# Patient Record
Sex: MALE | Race: WHITE | Employment: FULL TIME | URBAN - METROPOLITAN AREA
[De-identification: names, ages, dates, MRNs, and addresses within clinical notes are randomized per-mention and may not be internally consistent; named-entity substitution may affect disease eponyms.]

---

## 2017-01-16 DIAGNOSIS — I10 HYPERTENSION GOAL BP (BLOOD PRESSURE) < 140/90: Primary | ICD-10-CM

## 2017-01-16 NOTE — TELEPHONE ENCOUNTER
Lisinopril 10Mg Tablets      Last Written Prescription Date: 12/9/16  Last Fill Quantity: 30, # refills: 0  Last Office Visit with G, P or [facility] prescribing provider: 12/18/15       POTASSIUM   Date Value Ref Range Status   12/18/2015 4.6 3.4 - 5.3 mmol/L Final     CREATININE   Date Value Ref Range Status   12/18/2015 1.11 0.66 - 1.25 mg/dL Final     BP Readings from Last 3 Encounters:   12/18/15 154/100   10/09/15 130/86   08/01/14 136/88

## 2017-01-17 RX ORDER — LISINOPRIL 10 MG/1
10 TABLET ORAL DAILY
Qty: 30 TABLET | Refills: 0 | Status: SHIPPED | OUTPATIENT
Start: 2017-01-17 | End: 2017-01-24

## 2017-01-17 NOTE — TELEPHONE ENCOUNTER
Telephone call to patient regarding refill request. Informed patient that his last clinic visit was 12/18/2015 and he will need to schedule a clinic appointment prior to refilling medication.     Patient declines to schedule at this time. Will call back to do so. Will wait for patient to schedule an appointment and then continue to process refill request.       Dawna Jacques RN  [facility]

## 2017-01-17 NOTE — TELEPHONE ENCOUNTER
Patient has scheduled a follow up visit with Dr. Acevedo for high blood pressure on 1/24/17.    Patient requests a refill of the following medication:   lisinopril (PRINIVIL/ZESTRIL) 10 MG tablet      Dr. Acevedo, patient would like a refill of his medication until his 1/24/17 clinic visit with you.       Last clinic visit: 12/18/15  One 30 day christopher refill was provided to patient last month.     Pharmacy and medication cued.    Dawna Jacques RN  [facility]

## 2017-01-20 DIAGNOSIS — G89.29 CHRONIC BILATERAL LOW BACK PAIN WITHOUT SCIATICA: Primary | ICD-10-CM

## 2017-01-20 DIAGNOSIS — M54.50 CHRONIC BILATERAL LOW BACK PAIN WITHOUT SCIATICA: Primary | ICD-10-CM

## 2017-01-20 NOTE — TELEPHONE ENCOUNTER
Please call patient  I have not seen him in over a year , so is way overdue for OV/labs  Ok 1 month but needs to see me within a month for OV/ fasting labs

## 2017-01-20 NOTE — TELEPHONE ENCOUNTER
Routing refill request to provider for review/approval because:  Drug diagnosis of chronic low back pain is not on [facility] protocol    Dr. Acevedo -- please review and sign/advise. Thank you    OCTAVIO KaminskiN, RN  [facility]

## 2017-01-20 NOTE — TELEPHONE ENCOUNTER
Left message on patient's cell phone to call and schedule.    Mariela Araujo, OCTAVION, RN  [facility] [place] [facility]

## 2017-01-20 NOTE — TELEPHONE ENCOUNTER
Amitriptyline 25MG Tablets      Last Written Prescription Date: 10/21/16  Last Quantity: 90, # refills: 0  Last Office Visit with [facility], [facility] or [facility] prescribing provider: 12/18/15   Next 5 appointments (look out 90 days)     Jan 24, 2017  3:30 PM   Office Visit with Christopher [facility]o [place]   [facility]    [address] [place] [id number] [phone]                   CREATININE   Date Value Ref Range Status   12/18/2015 1.11 0.66 - 1.25 mg/dL Final     AST       19   10/9/2015  ALT       42   10/9/2015  BP Readings from Last 3 Encounters:   12/18/15 154/100   10/09/15 130/86   08/01/14 136/88

## 2017-01-24 ENCOUNTER — OFFICE VISIT (OUTPATIENT)
Dept: FAMILY MEDICINE | Facility: CLINIC | Age: 62
End: 2017-01-24
Payer: COMMERCIAL

## 2017-01-24 VITALS
SYSTOLIC BLOOD PRESSURE: 124 MMHG | WEIGHT: 198.9 LBS | OXYGEN SATURATION: 96 % | BODY MASS INDEX: 27.85 KG/M2 | DIASTOLIC BLOOD PRESSURE: 72 MMHG | TEMPERATURE: 98.1 F | HEIGHT: 71 IN | HEART RATE: 94 BPM

## 2017-01-24 DIAGNOSIS — G89.29 CHRONIC BILATERAL LOW BACK PAIN WITHOUT SCIATICA: ICD-10-CM

## 2017-01-24 DIAGNOSIS — Z23 NEED FOR PNEUMOCOCCAL VACCINATION: ICD-10-CM

## 2017-01-24 DIAGNOSIS — I10 HYPERTENSION GOAL BP (BLOOD PRESSURE) < 140/90: Primary | ICD-10-CM

## 2017-01-24 DIAGNOSIS — Z23 NEED FOR SHINGLES VACCINE: ICD-10-CM

## 2017-01-24 DIAGNOSIS — M54.50 CHRONIC BILATERAL LOW BACK PAIN WITHOUT SCIATICA: ICD-10-CM

## 2017-01-24 DIAGNOSIS — Z13.6 CARDIOVASCULAR SCREENING; LDL GOAL LESS THAN 130: ICD-10-CM

## 2017-01-24 PROCEDURE — 80053 COMPREHEN METABOLIC PANEL: CPT | Performed by: FAMILY MEDICINE

## 2017-01-24 PROCEDURE — 99214 OFFICE O/P EST MOD 30 MIN: CPT | Mod: 25 | Performed by: FAMILY MEDICINE

## 2017-01-24 PROCEDURE — 90670 PCV13 VACCINE IM: CPT | Performed by: FAMILY MEDICINE

## 2017-01-24 PROCEDURE — 80061 LIPID PANEL: CPT | Performed by: FAMILY MEDICINE

## 2017-01-24 PROCEDURE — 90736 HZV VACCINE LIVE SUBQ: CPT | Performed by: FAMILY MEDICINE

## 2017-01-24 PROCEDURE — 90471 IMMUNIZATION ADMIN: CPT | Performed by: FAMILY MEDICINE

## 2017-01-24 PROCEDURE — 36415 COLL VENOUS BLD VENIPUNCTURE: CPT | Performed by: FAMILY MEDICINE

## 2017-01-24 PROCEDURE — 90472 IMMUNIZATION ADMIN EACH ADD: CPT | Performed by: FAMILY MEDICINE

## 2017-01-24 RX ORDER — LISINOPRIL 10 MG/1
10 TABLET ORAL DAILY
Qty: 90 TABLET | Refills: 3 | Status: SHIPPED | OUTPATIENT
Start: 2017-01-24 | End: 2017-02-21

## 2017-01-24 NOTE — NURSING NOTE
"Chief Complaint   Patient presents with     Hypertension       Initial /72 mmHg  Pulse 94  Temp(Src) 98.1  F (36.7  C) (Oral)  Ht 5' 11\" (1.803 m)  Wt 198 lb 14.4 oz (90.22 kg)  BMI 27.75 kg/m2  SpO2 96% Estimated body mass index is 27.75 kg/(m^2) as calculated from the following:    Height as of this encounter: 5' 11\" (1.803 m).    Weight as of this encounter: 198 lb 14.4 oz (90.22 kg).  BP completed using cuff size: regular    Humera Miller MA      "

## 2017-01-24 NOTE — PROGRESS NOTES
"  SUBJECTIVE:                                                    Alex Gaviria is a 62 year old male who presents to clinic today for the following health issues:      Hypertension Follow-up      Outpatient blood pressures are being checked at home.  Results are 120/80 on average .    Low Salt Diet: no added salt       Amount of exercise or physical activity: 2-3 days/week for an average of 15-30 minutes    Problems taking medications regularly: No    Medication side effects: none    Diet: regular (no restrictions)    Encounter Diagnoses   Name Primary?     Hypertension goal BP (blood pressure) < 140/90 Yes     CARDIOVASCULAR SCREENING; LDL GOAL LESS THAN 130, needs recheck      Chronic bilateral low back pain without sciatica, sleeps well with elavil      Need for pneumococcal vaccination      Need for shingles vaccine         Problem list and histories reviewed & adjusted, as indicated.  Additional history: as documented    Problem list, Medication list, Allergies, and Medical/Social/Surgical histories reviewed in EPIC and updated as appropriate.    ROS:  Constitutional, HEENT, cardiovascular, pulmonary, gi and gu systems are negative, except as otherwise noted.    OBJECTIVE:                                                    /72 mmHg  Pulse 94  Temp(Src) 98.1  F (36.7  C) (Oral)  Ht 5' 11\" (1.803 m)  Wt 198 lb 14.4 oz (90.22 kg)  BMI 27.75 kg/m2  SpO2 96%  Body mass index is 27.75 kg/(m^2).  GENERAL: healthy, alert and no distress  HENT: ear canals and TM's normal, nose and mouth without ulcers or lesions  NECK: no adenopathy, no asymmetry, masses, or scars and thyroid normal to palpation  RESP: lungs clear to auscultation - no rales, rhonchi or wheezes  CV: regular rate and rhythm, normal S1 S2, no S3 or S4, no murmur, click or rub, no peripheral edema and peripheral pulses strong  ABDOMEN: soft, nontender, no hepatosplenomegaly, no masses and bowel sounds normal  PSYCH: mentation appears normal, " affect normal/bright         ASSESSMENT/PLAN:                                                            1. Hypertension goal BP (blood pressure) < 140/90  Well controlled   - Comprehensive metabolic panel (BMP + Alb, Alk Phos, ALT, AST, Total. Bili, TP)  - Albumin Random Urine Quantitative  - Lipid Profile  - lisinopril (PRINIVIL/ZESTRIL) 10 MG tablet; Take 1 tablet (10 mg) by mouth daily  Dispense: 90 tablet; Refill: 3  Follow up in 6 months.   2. CARDIOVASCULAR SCREENING; LDL GOAL LESS THAN 130  recheck  - Lipid Profile    3. Chronic bilateral low back pain without sciatica  Well controlled , very active  OK elavil for sleep    4. Need for pneumococcal vaccination  done  - PNEUMOCOCCAL CONJ VACCINE 13 VALENT IM (PREVNAR 13)    5. Need for shingles vaccine  done  - ZOSTER VACC LIVE SUBQ NJX    Regular exercise  See Patient Instructions    Christopher Acevedo MD  [facility]

## 2017-01-25 LAB
ALBUMIN SERPL-MCNC: 3.7 G/DL (ref 3.4–5)
ALP SERPL-CCNC: 100 U/L (ref 40–150)
ALT SERPL W P-5'-P-CCNC: 28 U/L (ref 0–70)
ANION GAP SERPL CALCULATED.3IONS-SCNC: 6 MMOL/L (ref 3–14)
AST SERPL W P-5'-P-CCNC: 12 U/L (ref 0–45)
BILIRUB SERPL-MCNC: 0.3 MG/DL (ref 0.2–1.3)
BUN SERPL-MCNC: 14 MG/DL (ref 7–30)
CALCIUM SERPL-MCNC: 8.6 MG/DL (ref 8.5–10.1)
CHLORIDE SERPL-SCNC: 107 MMOL/L (ref 94–109)
CHOLEST SERPL-MCNC: 181 MG/DL
CO2 SERPL-SCNC: 27 MMOL/L (ref 20–32)
CREAT SERPL-MCNC: 1.11 MG/DL (ref 0.66–1.25)
GFR SERPL CREATININE-BSD FRML MDRD: 67 ML/MIN/1.7M2
GLUCOSE SERPL-MCNC: 101 MG/DL (ref 70–99)
HDLC SERPL-MCNC: 51 MG/DL
LDLC SERPL CALC-MCNC: 78 MG/DL
NONHDLC SERPL-MCNC: 130 MG/DL
POTASSIUM SERPL-SCNC: 3.9 MMOL/L (ref 3.4–5.3)
PROT SERPL-MCNC: 6.9 G/DL (ref 6.8–8.8)
SODIUM SERPL-SCNC: 140 MMOL/L (ref 133–144)
TRIGL SERPL-MCNC: 261 MG/DL

## 2017-04-14 ENCOUNTER — TRANSFERRED RECORDS (OUTPATIENT)
Dept: HEALTH INFORMATION MANAGEMENT | Facility: CLINIC | Age: 62
End: 2017-04-14

## 2017-04-20 ENCOUNTER — OFFICE VISIT (OUTPATIENT)
Dept: FAMILY MEDICINE | Facility: CLINIC | Age: 62
End: 2017-04-20
Payer: COMMERCIAL

## 2017-04-20 VITALS
HEIGHT: 71 IN | DIASTOLIC BLOOD PRESSURE: 86 MMHG | TEMPERATURE: 98.2 F | SYSTOLIC BLOOD PRESSURE: 134 MMHG | HEART RATE: 78 BPM | OXYGEN SATURATION: 100 % | BODY MASS INDEX: 27.83 KG/M2 | WEIGHT: 198.8 LBS

## 2017-04-20 DIAGNOSIS — M54.16 LUMBAR RADICULOPATHY: Primary | ICD-10-CM

## 2017-04-20 PROCEDURE — 99213 OFFICE O/P EST LOW 20 MIN: CPT | Performed by: FAMILY MEDICINE

## 2017-04-20 NOTE — PROGRESS NOTES
SUBJECTIVE:                                                    Alex Gaviria is a 62 year old male who presents to clinic today for the following health issues:      Right leg  Pain     Onset: 10 year but worse in the past 5 weeks    Description:   Location: right leg  Character: Sharp and Stabbing    Intensity: moderate to      Progression of Symptoms: worse, intermittent    Accompanying Signs & Symptoms:  Other symptoms: radiation of pain to foot   History:   Previous similar pain: YES      Precipitating factors:   Trauma or overuse: no     Alleviating factors:  Improved by: NSAID - advil       Therapies Tried and outcome: Advil some time helps not always      . Symptoms have been worsening since that time. Prior history of back problems: previous spinal surgery - cervical. He has radicular pain down to the right foot but There is no numbness in the legs.    OBJECTIVE:  Vital signs as noted above. Patient appears to be in mild to moderate pain, antalgic gait noted. Lumbosacral spine area reveals no local tenderness or mass. Painful and reduced LS ROM noted.   }. DTR's, motor strength and sensation normal, including heel and toe gait.  Peripheral pulses are palpable. spine X-Ray: ordered, but results not yet available.     ASSESSMENT:   possible herniated disc at L 5-S1    PLAN:     Orders Placed This Encounter     MR Lumbar Spine w/o & w Contrast     Standing Status:   Future     Standing Expiration Date:   4/20/2018     Order Specific Question:   Reason for exam:     Answer:   10 years of intermittent low back/ leg with recent worsening x 5 weeks     Order Specific Question:   What is the patient's weight in pounds?     Answer:   198     Order Specific Question:   Does the patient have a cochlear implant, pacemaker or ICD wires?, if so, contact the MRI department before proceeding.     Answer:   NO - order as requested     Order Specific Question:   Does the patient have claustrophobia?     Answer:    No     Order Specific Question:   Appointment urgency?     Answer:   WITHIN ONE WEEK     Order Specific Question:   See order COMMENTS     Answer:   DONE     Order Specific Question:   PLEASE ANSWER ONE OF THE FOLLOWING QUESTIONS!     Answer:   SELECT ONE ONLY     Order Specific Question:   Question: radiculopathy +/- LBP?     Answer:   A) Radiates past knee, duration >6 wks  [40]     SPINE SURGERY REFERRAL     Referral Type:   Consultation     HYDROcodone-acetaminophen (VICODIN) 5-500 MG per tablet     Sig: Take 1 tablet by mouth

## 2017-04-20 NOTE — PROGRESS NOTES
"Chief Complaint   Patient presents with     Musculoskeletal Problem       Initial /86  Pulse 78  Temp 98.2  F (36.8  C) (Oral)  Ht 5' 11\" (1.803 m)  Wt 198 lb 12.8 oz (90.2 kg)  SpO2 100%  BMI 27.73 kg/m2 Estimated body mass index is 27.73 kg/(m^2) as calculated from the following:    Height as of this encounter: 5' 11\" (1.803 m).    Weight as of this encounter: 198 lb 12.8 oz (90.2 kg).  Medication Reconciliation: complete     Humera Miller MA      "

## 2017-04-20 NOTE — MR AVS SNAPSHOT
After Visit Summary   4/20/2017    Alex Gaviria    MRN: [id number]           Patient Information     Date Of Birth          1955        Visit Information        Provider Department      4/20/2017 3:00 PM Christopher Acevedo MD [facility]        Today's Diagnoses     Lumbar radiculopathy    -  1       Follow-ups after your visit        Additional Services     SPINE SURGERY REFERRAL       Please choose Medical Spine Specialist (unless patient was seen by a Medical Spine Specialist within the past 6 months).  Surgical Evaluation is advised if the patient presents with one or more of the following red flags:     **Cauda Equina Syndrome  **Evidence of Spinal Tumor  **Fracture  **Infection  **Loss of Bowel or Bladder Control  **Sudden or Progressive Weakness  **Any other documented emergent neurological condition resulting from a Lumbar Spinal Condition.    You have been referred to: Spine Lumbar: Spine Surgeon: [facility]: Neurosurgery Clinic [facility] [phone]   [email or web address]    Please be aware that coverage of these services is subject to the terms and limitations of your health insurance plan.  Call member services at your health plan with any benefit or coverage questions.      Please bring the following to your appointment:    **Any x-rays, CTs or MRIs which have been performed.  Contact the facility where they were done to arrange for  prior to your scheduled appointment.    **List of current medications   **This referral request   **Any documents/labs given to you regarding this referral                  Future tests that were ordered for you today     Open Future Orders        Priority Expected Expires Ordered    MR Lumbar Spine w/o & w Contrast Routine  4/20/2018 4/20/2017            Who to contact     If you have questions or need follow up information about today's clinic visit or your schedule please contact  [place] directly at [phone].  Normal or non-critical lab and imaging results will be communicated to you by MyChart, letter or phone within 4 business days after the clinic has received the results. If you do not hear from us within 7 days, please contact the clinic through MyChart or phone. If you have a critical or abnormal lab result, we will notify you by phone as soon as possible.  Submit refill requests through [employer] or call your pharmacy and they will forward the refill request to us. Please allow 3 business days for your refill to be completed.          Additional Information About Your Visit        [employer] Information     [employer] lets you send messages to your doctor, view your test results, renew your prescriptions, schedule appointments and more. To sign up, go to [email or web address] . Click on \"Log in\" on the left side of the screen, which will take you to the Welcome page. Then click on \"Sign up Now\" on the right side of the page.     You will be asked to enter the access code listed below, as well as some personal information. Please follow the directions to create your username and password.     Your access code is: [email or web address]  Expires: 2017  4:19 PM     Your access code will  in 90 days. If you need help or a new code, please call your [facility] clinic or [phone].        Care EveryWhere ID     This is your Care EveryWhere ID. This could be used by other organizations to access your [facility] medical records  [id number]        Your Vitals Were     Pulse Temperature Height Pulse Oximetry BMI (Body Mass Index)       78 98.2  F (36.8  C) (Oral) 5' 11\" (1.803 m) 100% 27.73 kg/m2        Blood Pressure from Last 3 Encounters:   17 134/86   17 124/72   12/18/15 (!) 154/100    Weight from Last 3 Encounters:   17 198 lb 12.8 oz (90.2 kg)   17 198 lb 14.4 oz (90.2 kg)   12/18/15 204 lb 8 oz (92.8 kg)              We Performed the Following  "    SPINE SURGERY REFERRAL        Primary Care Provider Office Phone # Fax #    Christopher Acevedo [phone]       [facility] [address] [facility] [address]  [facility] [address]        Thank you!     Thank you for choosing [facility]  for your care. Our goal is always to provide you with excellent care. Hearing back from our patients is one way we can continue to improve our services. Please take a few minutes to complete the written survey that you may receive in the mail after your visit with us. Thank you!             Your Updated Medication List - Protect others around you: Learn how to safely use, store and throw away your medicines at www.disposemymeds.org.          This list is accurate as of: 4/20/17  3:11 PM.  Always use your most recent med list.                   Brand Name Dispense Instructions for use    amitriptyline 25 MG tablet    ELAVIL    90 tablet    Take 1 tablet (25 mg) by mouth At Bedtime       HYDROcodone-acetaminophen 5-500 MG per tablet    VICODIN     Take 1 tablet by mouth       lisinopril 10 MG tablet    PRINIVIL/ZESTRIL    90 tablet    Take 1 tablet (10 mg) by mouth daily

## 2017-05-08 ENCOUNTER — HOSPITAL ENCOUNTER (OUTPATIENT)
Dept: MRI IMAGING | Facility: CLINIC | Age: 62
Discharge: HOME | End: 2017-05-08
Attending: FAMILY MEDICINE | Admitting: FAMILY MEDICINE
Payer: COMMERCIAL

## 2017-05-08 DIAGNOSIS — M54.16 LUMBAR RADICULOPATHY: ICD-10-CM

## 2017-05-08 PROCEDURE — 72148 MRI LUMBAR SPINE W/O DYE: CPT

## 2017-05-09 ENCOUNTER — OFFICE VISIT (OUTPATIENT)
Dept: NEUROSURGERY | Facility: CLINIC | Age: 62
End: 2017-05-09

## 2017-05-09 VITALS — DIASTOLIC BLOOD PRESSURE: 83 MMHG | HEART RATE: 85 BPM | HEIGHT: 71 IN | SYSTOLIC BLOOD PRESSURE: 126 MMHG

## 2017-05-09 DIAGNOSIS — M54.16 LUMBAR RADICULOPATHY, CHRONIC: ICD-10-CM

## 2017-05-09 DIAGNOSIS — M48.061 SPINAL STENOSIS, LUMBAR REGION, WITHOUT NEUROGENIC CLAUDICATION: Primary | ICD-10-CM

## 2017-05-09 ASSESSMENT — PAIN SCALES - GENERAL: PAINLEVEL: NO PAIN (1)

## 2017-05-09 NOTE — NURSING NOTE
Chief Complaint   Patient presents with     Consult     Lumbar Radiculopathy    Holly Diamond CMA

## 2017-05-09 NOTE — LETTER
5/9/2017     RE: Alex Gaviria  [address] [address]     Dear Colleague,    Thank you for referring your patient, Alex Gaviria, to the [facility] NEUROSURGERY at [facility]. Please see a copy of my visit note below.    TEMPLATED DRAFT ONLY;  FINAL DICTATED NOTE TO FOLLOW.  Neurosurgery Clinic Consult  Date of Visit: 5/9/2017  Referred for:   Referring Provider:    Current Outpatient Prescriptions:      lisinopril (PRINIVIL/ZESTRIL) 10 MG tablet, Take 1 tablet (10 mg) by mouth daily, Disp: 90 tablet, Rfl: 3     amitriptyline (ELAVIL) 25 MG tablet, Take 1 tablet (25 mg) by mouth At Bedtime, Disp: 90 tablet, Rfl: 1     HYDROcodone-acetaminophen (VICODIN) 5-500 MG per tablet, Take 1 tablet by mouth Reported on 5/9/2017, Disp: , Rfl:     Allergies   Allergen Reactions     No Known Allergies        Past Medical History:   Diagnosis Date     Chronic back pain      Degenerative disc disease, cervical 1990s    laminectomy     Hearing loss 2008    aids     Inguinal hernia without mention of obstruction or gangrene, unilateral or unspecified, (not specified as recurrent) 6/2013    Left     PONV (postoperative nausea and vomiting)      Rib fracture Oct 2012     Spinal stenosis in cervical region     surgery       Past Surgical History:   Procedure Laterality Date     LAPAROSCOPIC HERNIORRHAPHY INGUINAL  6/21/2013    Procedure: LAPAROSCOPIC HERNIORRHAPHY INGUINAL;  Left Laparoscopic Inguinal Hernia Repair with Mesh ;  Surgeon: Bharathi Vance MD;  [facility]     NECK SURGERY  1998    C5     spinal stenosis  2008    C3,4,5 plate       Family History   Problem Relation Age of Onset     CANCER No family hx of      No known family hx of skin cancer       Social History     Social History     Marital status:      Spouse name: N/A     Number of children: N/A     Years of education: N/A     Occupational History     Not on file.     Social History Main  "Topics     Smoking status: Current Some Day Smoker     Years: 20.00     Types: Cigars     Smokeless tobacco: Never Used     Alcohol use Yes      Comment: 3 beers per day     Drug use: No     Sexual activity: Yes     Partners: Female     Birth control/ protection: Surgical     Other Topics Concern     Parent/Sibling W/ Cabg, Mi Or Angioplasty Before 65f 55m? No     Social History Narrative     Problem list and 13 point review of systems: is reviewed in Epic and is negative with the exception of those symptoms associated with HPI and PMH.      OBJECTIVE:   /83  Pulse 85  Ht 1.803 m (5' 11\")    Imaging:  These are the pertinent radiologist's findings from:  MRI Lumbar WO 5/8/17 @ [facility].  1. Multilevel degenerative disc and facet disease.  2. L2-L3: Mild left disc extrusion with mass effect on the L3 nerve  root. Minimal central stenosis.  3. L3-L4: Mild central stenosis. Moderate left foraminal stenosis.  4. L4-L5: Moderate central stenosis. Moderate-severe right foraminal  stenosis.  5. L5-S1: Borderline central stenosis.   See the full report in EPIC/Media tab.  I personally reviewed the images with the patient.      Exam:  Pertinent positives:    *   Well developed, well nourished male found seated comfortably in exam room chair.  He is able to sit and rise independently.  He is accompanied by.    *  Mental Status  A&O X3.  Bright, alert, affable, interactive. Language fluid, fund of knowledge intact. Good historian.  Mood and affect congruent and WNL.  .   *  Cranial Nerves  II: Able to read printed forms, VF full to gross confrontation.  III: IV, VI:  PERRLA, EOMI, No nystagmus, no ptosis.  V: Sensation intact in bilateral V1, V2, and V3. Jaw clench symmetric.    VII:  Intact to voice bilaterally with hearing aides in each ear..  IX:  Pushes tongue against bilateral cheeks.  X:  Palate elevates, uvula midline, phonation intact.  XI: Elevates shoulders, head turn intact.  XII: Tongue midline. No " fasciculations.  *  Cerebellar:  Finger nose accurate.   *  Yuval-cranial:    No drift.  *  Cervical Spine:   No Tinel's. No fasciculations.   Muscle bulk and tone WNL.  Upper Extremity Strength.              RIGHT                LEFT     Deltoid              5/5                   5/5       Biceps              5/5                   5/5        Triceps              5/5                   5/5       Wrist Extensor              5/5                   5/5                     5/5                    5/5       Interossei              5/5                   5/5       EPL              5/5                    5/5       Pinch              5/5                  5/5            *  Lumbar Spine:     DTR's Patellar and Achilles 1/4 and symmetric.   No fasciculations.  Muscle bulk and tone WNL.  No Clonus.   Lower Extremity Strength                   RIGHT                   LEFT     Iliopsoas                    5/5                      5/5       Quad                    5/5                        5/5       Hamstring                      5/5                        5/5         Gastrocs                    5/5                        5/5       Tib. Anterior                     5/5                        5/5       EHL                      5/5                        5/5       Babinski                 Down                                      Down                   *  Structural Exam  Inspection of the spine reveals no scoliosis, exaggerated kyphosis or lordosis. Prior surgical scars are well healed. Head is balanced over the pelvis in the coronal and sagittal plane.    Cervical spine ROM full. No spasming. No tenderness to palpation.  No Spurling's or L'Hermitte's.   Lumbar ROM full.  Able to doff and don shoes without difficulty. No spasming, no tenderness, no step offs. No SLR..  Feet are warm.  Gait narrow, smooth, no scissoring. No antalgia.   *  Sensory level intact.   *  Angelica's 5/5 are negative.   (Angelica's are:   non anatomic  tenderness;   pain with simulated exam, trunk rotation or axial load;    positive distraction tests e.g.. SLR;    regional disturbances, non anatomic pain, numbness, weakness;  overreaction to testing.)    ASSESSMENT:  1. Spinal stenosis, lumbar region, without neurogenic claudication    2. Lumbar radiculopathy, chronic      PLAN:  Overview  Right L4/5 TF PERLA   PT  RTC to me in 4-6     *  All the patient's questions have been answered and they demonstrate good understanding of the above.    *  Alex Gaviria has our contact information and is aware that s-he should call if s-he has questions comments or concerns.   We appreciate the opportunity to be of service in the care of this prema patient  Please do call if there is anything more we can do..    Best Regards,    Marion Healy PA-C  [facility]  Department of Neurosurgery  Phone: [phone]  Fax: [phone]    Total time: 60 minutes with more than 30 minutes spent in direct face to face contact reviewing films, providing education, counseling, the importance of good health habits, non-operative therapies,and indications for surgery as well as further follow up.    NEUROSURGICAL CLINIC CONSULT      REFERRED FOR:  Right leg pain.      REFERRING PROVIDER:  Christopher Acevedo MD      Dear Dr. 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      As you may recall, his history begins more than 10 years ago with no particular inciting event in which he would have low back pain, right leg pain.  This seemed to get better with Advil, sometimes therapies, but no surgery.  He notes that he did have a previous history of cervical spinal surgery performed by Dr. Rodriguez at [facility] about 15 years ago.  He thinks it was done from C3-C7, and no fusion was performed.  At that time, he was suffering from arm pain and that has been alleviated.       With his back pain, it seems to go to the right leg.  The exacerbations used to be relatively short and far between, but the exacerbations or getting worse over time, closer together and more intense when they arrive.  There is rarely a particular incident he can point to that it causes trouble or starts the cycle up again.  The location is in the right lumbar area, radiating into the right buttock, right posterolateral leg all the way to the ankle.  It is described as sharp, cramping and deep.  It is 7-8/10 at its worst.  It seemed to be worst in March, about 5 weeks ago when it first started and has gotten better since that time.  Currently, it drops down as low as a 1/10 if he is not doing much of anything, if he is sitting quietly.  He is not sure what makes to ease off.  In this particular instance, he had a Medrol Dosepak which did not seem to make all that much difference, but simply a tincture of time seemed to help the most.  He denies loss of bowel or bladder function.  Denies weakness.  He has numbness in the back of the right thigh which is described as pins and needles difficulty.  No loss of coordination or balance.  He presents now for evaluation and treatment recommendations with the understanding that we are a surgical team.  He is quite straightforward and admitting that he is hoping that surgery can be done since he knows he has spinal stenosis and his insurance is going to change in about a year.  He is hopeful that he can get this problem taken care of relatively soon so that when his insurance changes he does not have to worry about trying to find coverage for a surgery.      Physical Examination:  Pertinent positives - none.      Impression and Plan:  Mr. Gaviria has a mild spinal stenosis, and we did discuss that the natural history of this problem is that it gets worse over time.  Given that he has a right lumbar radiculopathy, we specifically looked at right side, and I do find that there  is impingement at the right L4-5 level, consistent with his pain pattern.  I believe a right L4-5 transforaminal PERLA would afford some benefit, as well as some physical therapy.      Most of his symptoms have abated on their own in the past few weeks, so I think we can just solidify his gains by doing these simple measures.  Surgery is not indicated at this time because his symptoms are abating on their own.  I think we can manage this nonoperatively.      It is not 100% certain that he will come to surgery for this eventually, but we did discuss today what kind of symptoms would qualify him for surgery.  Those would be intractable pain, loss of function or some combination of those things.  The patient has been supplied with business cards and telephone numbers and urged to call should he have questions, comments or concerns between now and the time of his next visit.  His questions have been answered, and they demonstrated good understanding of the issues involved today.        We have appreciated the opportunity to be of service in the care of this nice patient.  We thank you for your confidence in the [facility] Department of Neurosurgery.         JENNIFER JETER PA-C        D: 2017 12:21   T: 05/10/2017 08:24   MT: SANIA    Name:     ALBERT GUTIERREZ   MRN:      [id number]        Account:      [id number]   :      1955           Service Date: 2017    Document: [id number]

## 2017-05-09 NOTE — PROGRESS NOTES
Neurosurgery Clinic Consult  Date of Visit: 5/9/2017  Referred for:  Right leg pain.  Referring Provider:  Christopher Acevedo MD      Dear Dr. 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      As you may recall, his history begins more than 10 years ago with no particular inciting event in which he would have low back pain, right leg pain.  This seemed to get better with Advil, sometimes therapies, but no surgery.  The exacerbations used to be relatively short and far between, but the exacerbations are getting worse over time, closer together and more intense when they arrive.  There is rarely a particular incident he can point to that it causes trouble or starts the cycle up again. He notes that he did have a previous history of cervical spinal surgery performed by Dr. Rodriguez at [facility] about 15 years ago.  He thinks it was done from C3-C7, and no fusion was performed.  At that time, he was suffering from arm pain and that has been alleviated.      Location  With his back pain, it seems to go to the right leg. The location is in the right lumbar area, radiating into the right buttock, right posterolateral leg all the way to the ankle.  It is described as sharp, cramping and deep.  It is 7-8/10 at its worst.  It seemed to be worst in March, about 5 weeks ago when it first started and has gotten better since that time.  Currently, it's as low as a 1/10 if he is not doing much of anything, if he is sitting quietly.   He denies loss of bowel or bladder function.  Denies weakness.  He has numbness in the back of the right thigh which is described as pins and needles difficulty.  No loss of coordination or balance  Treatments  He had a Medrol Dosepak which did not seem to make all that much difference, but simply a tincture of time seemed to help the most.      He presents now for evaluation and treatment recommendations with  the understanding that we are a surgical team.  He is quite straightforward and admitting that he is hoping that surgery can be done since he knows he has spinal stenosis and his insurance is going to change in about a year.  He is hopeful that he can get this problem taken care of relatively soon so that when his insurance changes he does not have to worry about trying to find coverage for a surgery.         Current Outpatient Prescriptions:      lisinopril (PRINIVIL/ZESTRIL) 10 MG tablet, Take 1 tablet (10 mg) by mouth daily, Disp: 90 tablet, Rfl: 3     amitriptyline (ELAVIL) 25 MG tablet, Take 1 tablet (25 mg) by mouth At Bedtime, Disp: 90 tablet, Rfl: 1     HYDROcodone-acetaminophen (VICODIN) 5-500 MG per tablet, Take 1 tablet by mouth Reported on 5/9/2017, Disp: , Rfl:     Allergies   Allergen Reactions     No Known Allergies        Past Medical History:   Diagnosis Date     Chronic back pain      Degenerative disc disease, cervical 1990s    laminectomy     Hearing loss 2008    aids     Inguinal hernia without mention of obstruction or gangrene, unilateral or unspecified, (not specified as recurrent) 6/2013    Left     PONV (postoperative nausea and vomiting)      Rib fracture Oct 2012     Spinal stenosis in cervical region     surgery       Past Surgical History:   Procedure Laterality Date     LAPAROSCOPIC HERNIORRHAPHY INGUINAL  6/21/2013    Procedure: LAPAROSCOPIC HERNIORRHAPHY INGUINAL;  Left Laparoscopic Inguinal Hernia Repair with Mesh ;  Surgeon: Bharathi Vance MD;  [facility]     NECK SURGERY  1998    C5     spinal stenosis  2008    C3,4,5 plate       Family History   Problem Relation Age of Onset     CANCER No family hx of      No known family hx of skin cancer       Social History     Social History     Marital status:      Spouse name: N/A     Number of children: N/A     Years of education: N/A     Occupational History     Not on file.     Social History Main Topics     Smoking  "status: Current Some Day Smoker     Years: 20.00     Types: Cigars     Smokeless tobacco: Never Used     Alcohol use Yes      Comment: 3 beers per day     Drug use: No     Sexual activity: Yes     Partners: Female     Birth control/ protection: Surgical     Other Topics Concern     Parent/Sibling W/ Cabg, Mi Or Angioplasty Before 65f 55m? No     Social History Narrative   Problem list and 13 point review of systems: is reviewed in Epic and is negative with the exception of those symptoms associated with HPI and PMH.      OBJECTIVE:   /83  Pulse 85  Ht 1.803 m (5' 11\")    Imaging:  These are the pertinent radiologist's findings from:  MRI Lumbar WO 5/8/17 @ [facility].  1. Multilevel degenerative disc and facet disease.  2. L2-L3: Mild left disc extrusion with mass effect on the L3 nerve  root. Minimal central stenosis.  3. L3-L4: Mild central stenosis. Moderate left foraminal stenosis.  4. L4-L5: Moderate central stenosis. Moderate-severe right foraminal  stenosis.  5. L5-S1: Borderline central stenosis.   See the full report in EPIC/Media tab.  I personally reviewed the images with the patient.      Exam:  Pertinent positives:  none  *   Well developed, well nourished male found seated comfortably in exam room chair.  He is able to sit and rise independently.  He is unaccompanied.    *  Mental Status  A&O X3.  Bright, alert, affable, interactive. Language fluid, fund of knowledge intact. Good historian.  Mood and affect congruent and WNL.  *  Cranial Nerves  II: Able to read printed forms, VF full to gross confrontation.  III: IV, VI:  PERRLA, EOMI, No nystagmus, no ptosis.  V: Sensation intact in bilateral V1, V2, and V3. Jaw clench symmetric.    VII:  Intact to voice bilaterally with hearing aides in each ear..  IX:  Pushes tongue against bilateral cheeks.  X:  Palate elevates, uvula midline, phonation intact.  XI: Elevates shoulders, head turn intact.  XII: Tongue midline. No fasciculations.  *  " Cerebellar:  Finger nose accurate.   *  Yuval-cranial:    No drift.  *  Cervical Spine:   No Tinel's. No fasciculations.   Muscle bulk and tone WNL.  Upper Extremity Strength.              RIGHT                LEFT     Deltoid              5/5                   5/5       Biceps              5/5                   5/5        Triceps              5/5                   5/5       Wrist Extensor              5/5                   5/5                     5/5                    5/5       Interossei              5/5                   5/5       EPL              5/5                    5/5       Pinch              5/5                  5/5            *  Lumbar Spine:     DTR's Patellar and Achilles 1/4 and symmetric.   No fasciculations.  Muscle bulk and tone WNL.  No Clonus.   Lower Extremity Strength                   RIGHT                   LEFT     Iliopsoas                    5/5                      5/5       Quad                    5/5                        5/5       Hamstring                      5/5                        5/5         Gastrocs                    5/5                        5/5       Tib. Anterior                     5/5                        5/5       EHL                      5/5                        5/5       Babinski                 Down                                      Down                     *  Structural Exam  Inspection of the spine reveals no scoliosis, exaggerated kyphosis or lordosis. Prior surgical scars are well healed. Head is balanced over the pelvis in the coronal and sagittal plane.    Cervical spine ROM full. No spasming. No tenderness to palpation.  No Spurling's or L'Hermitte's.   Lumbar ROM full.  Able to doff and don shoes without difficulty. No spasming, no tenderness, no step offs. No SLR..  Feet are warm.  Gait narrow, smooth, no scissoring. No antalgia.   *  Sensory level intact.     ASSESSMENT:  1. Spinal stenosis, lumbar region, without neurogenic claudication    2.  Lumbar radiculopathy, chronic      PLAN:  Overview  Mr. Gaviria has a mild spinal stenosis, and we did discuss that the natural history of this problem is that it gets worse over time.  Given that he has a right lumbar radiculopathy, we specifically looked at right side, and I do find that there is impingement at the right L4-5 level, consistent with his pain pattern.  I believe a right L4-5 transforaminal PERLA would afford some benefit, as might some physical therapy.      Most of his symptoms have abated on their own in the past few weeks, so I think we can just solidify his gains by doing these simple measures.  Surgery is not indicated at this time because his symptoms are abating on their own.   It is not 100% certain that he will come to surgery for this, he might, but we did discuss today what kind of symptoms would qualify him for surgery.  Those would be intractable pain, loss of function or some combination of those things.  The patient has been supplied with business cards and telephone numbers and urged to call should he have questions, comments or concerns between now and the time of his next visit.  His questions have been answered, and they demonstrated good understanding of the issues involved today.        We have appreciated the opportunity to be of service in the care of this nice patient.  We thank you for your confidence in the [facility] Department of Neurosurgery.      Best Regards,    Marion Healy PA-C  [facility]  Department of Neurosurgery  Phone: [phone]  Fax: [phone]        Total time: 60 minutes with more than 30 minutes spent in direct face to face contact reviewing films, providing education, counseling, the importance of good health habits, non-operative therapies,and indications for surgery as well as further follow up.

## 2017-05-09 NOTE — MR AVS SNAPSHOT
After Visit Summary   5/9/2017    Alex Gaviria    MRN: [id number]           Patient Information     Date Of Birth          1955        Visit Information        Provider Department      5/9/2017 11:00 AM Marion Healy PA-C [facility]        Today's Diagnoses     Spinal stenosis, lumbar region, without neurogenic claudication    -  1    Lumbar radiculopathy, chronic          Care Instructions    1.  Call [facility] to schedule your Physical Therapy consultation:  [address], [place] [address]  Phone: [phone]  2.  Call [facility] to schedule your injections at [phone]  3.  Follow up with Ms. Healy - you may contact JANET Larsen for any questions or concerns at [phone]         Follow-ups after your visit        Additional Services     PT Evaluation and Treatment (External Referral) [0968]       Your provider has referred you to: [facility], [address], [place] [address]  Phone: [phone]      Please be aware that coverage of these services is subject to the terms and limitations of your health insurance plan.  Call member services at your health plan with any benefit or coverage questions.      Treatment: Evaluation & Treatment  Special Instructions: None  Special Programs: None  Modalities: As indicated  Equipment: None  Duration and Frequency: Per Therapist Evaluation    Please bring the following to your appointment:  >>   Any x-rays, CTs or MRIs which have been performed.  Contact the facility where they were done to arrange for  prior to your scheduled appointment.  Any new CT, MRI or other procedures ordered by your specialist must be performed at a [facility] facility or coordinated by your clinic's referral office.    >>   List of current medications   >>   This referral request   >>   Any documents/labs given to you for this referral      **Note to Provider** To refer patients to therapy outside of  "the Location list, change the order class to External Referral in the General section.                  Your next 10 appointments already scheduled     2017  2:30 PM CDT   (Arrive by 2:15 PM)   Return Visit with JAMIL Gaspar [facility] ([facility] [place])    [address] [place] [id number] [phone]              Who to contact     Please call your clinic at [phone] to:    Ask questions about your health    Make or cancel appointments    Discuss your medicines    Learn about your test results    Speak to your doctor   If you have compliments or concerns about an experience at your clinic, or if you wish to file a complaint, please contact [facility] Patient Relations at [phone] or email us at [email or web address]         Additional Information About Your Visit        [employer] Information     nuMVC is an electronic gateway that provides easy, online access to your medical records. With nuMVC, you can request a clinic appointment, read your test results, renew a prescription or communicate with your care team.     To sign up for nuMVC visit the website at [email or web address]   You will be asked to enter the access code listed below, as well as some personal information. Please follow the directions to create your username and password.     Your access code is: [email or web address]  Expires: 2017  6:30 AM     Your access code will  in 90 days. If you need help or a new code, please contact your [facility] or call [phone] for assistance.        Care EveryWhere ID     This is your Care EveryWhere ID. This could be used by other organizations to access your [facility] medical records  [id number]        Your Vitals Were     Pulse Height                85 1.803 m (5' 11\")           Blood Pressure from Last 3 Encounters:   17 126/83   17 " 134/86   01/24/17 124/72    Weight from Last 3 Encounters:   04/20/17 90.2 kg (198 lb 12.8 oz)   01/24/17 90.2 kg (198 lb 14.4 oz)   12/18/15 92.8 kg (204 lb 8 oz)              We Performed the Following     PT Evaluation and Treatment (External Referral) [0865]        Primary Care Provider Office Phone # Fax #    Christopher Jose R Acevedo [phone]       [facility] [address] [facility] [address]  [facility] [address]        Thank you!     Thank you for choosing [facility]  for your care. Our goal is always to provide you with excellent care. Hearing back from our patients is one way we can continue to improve our services. Please take a few minutes to complete the written survey that you may receive in the mail after your visit with us. Thank you!             Your Updated Medication List - Protect others around you: Learn how to safely use, store and throw away your medicines at www.disposemymeds.org.          This list is accurate as of: 5/9/17 11:56 AM.  Always use your most recent med list.                   Brand Name Dispense Instructions for use    amitriptyline 25 MG tablet    ELAVIL    90 tablet    Take 1 tablet (25 mg) by mouth At Bedtime       HYDROcodone-acetaminophen 5-500 MG per tablet    VICODIN     Take 1 tablet by mouth Reported on 5/9/2017       lisinopril 10 MG tablet    PRINIVIL/ZESTRIL    90 tablet    Take 1 tablet (10 mg) by mouth daily

## 2017-05-09 NOTE — PATIENT INSTRUCTIONS
1.  Call [facility] to schedule your Physical Therapy consultation:  [address], [place] [address]  Phone: [phone]  2.  Call [facility] to schedule your injections at [phone]  3.  Follow up with Ms. Healy - you may contact JANET Larsen for any questions or concerns at [phone]

## 2017-08-18 DIAGNOSIS — M54.50 CHRONIC BILATERAL LOW BACK PAIN WITHOUT SCIATICA: ICD-10-CM

## 2017-08-18 DIAGNOSIS — G89.29 CHRONIC BILATERAL LOW BACK PAIN WITHOUT SCIATICA: ICD-10-CM

## 2017-08-18 NOTE — TELEPHONE ENCOUNTER
Dr. Acevedo--    Refill Request for the following:     amitriptyline (ELAVIL) 25 MG tablet  Last prescribed: 2/21/2017, 90 Tabs, 1 Refill    Last office visit: 4/20/17    unable to refill per protocol, different associated diagnosis (low back pain).    Dawna Jacques RN  [facility]

## 2018-02-15 DIAGNOSIS — M54.50 CHRONIC BILATERAL LOW BACK PAIN WITHOUT SCIATICA: ICD-10-CM

## 2018-02-15 DIAGNOSIS — G89.29 CHRONIC BILATERAL LOW BACK PAIN WITHOUT SCIATICA: ICD-10-CM

## 2018-02-16 NOTE — TELEPHONE ENCOUNTER
Routing refill request to provider for review/approval because:  Diagnosis not on the [facility] refill protocol Chronic bilateral low back pain without sciatica [M54.5, G89.29]     Thank you,  Gabriela Jones RN

## 2018-02-16 NOTE — TELEPHONE ENCOUNTER
"Requested Prescriptions   Pending Prescriptions Disp Refills     amitriptyline (ELAVIL) 25 MG tablet [Pharmacy Med Name: AMITRIPTYLINE 25MG TABLETS]    Last Written Prescription Date:  2-15-18  Last Fill Quantity: 60,  # refills: 0   Last office visit: 4/20/2017 with prescribing provider:  4-20-17   Future Office Visit:     90 tablet 0     Sig: TAKE 1 TABLET(25 MG) BY MOUTH AT BEDTIME    Tricyclic Antidepressants Protocol Passed    2/15/2018  5:52 PM       Passed - Blood pressure under 140/90 in past 12 months    BP Readings from Last 3 Encounters:   05/09/17 126/83   04/20/17 134/86   01/24/17 124/72                Passed - Recent (12 mo) or future (30 d) visit with authorizing provider's specialty     Patient had office visit in the last year or has a visit in the next 30 days with authorizing provider.  See \"Patient Info\" tab in inbasket, or \"Choose Columns\" in Meds & Orders section of the refill encounter.            Passed - Patient is age 18 or older        "

## 2018-04-21 DIAGNOSIS — I10 HYPERTENSION GOAL BP (BLOOD PRESSURE) < 140/90: ICD-10-CM

## 2018-04-23 NOTE — TELEPHONE ENCOUNTER
"Requested Prescriptions   Pending Prescriptions Disp Refills     lisinopril (PRINIVIL/ZESTRIL) 10 MG tablet [Pharmacy Med Name: LISINOPRIL 10MG TABLETS] 90 tablet 0    Last Written Prescription Date:  2/22/17  Last Fill Quantity: 90,  # refills: 3   Last office visit: 4/20/2017 with prescribing provider:     Future Office Visit:     Sig: TAKE 1 TABLET BY MOUTH EVERY DAY    ACE Inhibitors (Including Combos) Protocol Failed    4/21/2018  4:35 PM       Failed - Recent (12 mo) or future (30 days) visit within the authorizing provider's specialty    Patient had office visit in the last 12 months or has a visit in the next 30 days with authorizing provider or within the authorizing provider's specialty.  See \"Patient Info\" tab in inbasket, or \"Choose Columns\" in Meds & Orders section of the refill encounter.           Failed - Normal serum creatinine on file in past 12 months    Recent Labs   Lab Test  01/24/17   1553   CR  1.11            Failed - Normal serum potassium on file in past 12 months    Recent Labs   Lab Test  01/24/17   1553   POTASSIUM  3.9            Passed - Blood pressure under 140/90 in past 12 months    BP Readings from Last 3 Encounters:   05/09/17 126/83   04/20/17 134/86   01/24/17 124/72                Passed - Patient is age 18 or older        "

## 2018-04-24 RX ORDER — LISINOPRIL 10 MG/1
TABLET ORAL
Qty: 90 TABLET | Refills: 0 | Status: SHIPPED | OUTPATIENT
Start: 2018-04-24

## 2018-04-24 NOTE — TELEPHONE ENCOUNTER
Dr. Acevedo,    Please review/sign or advise for refill of lisinopril 10 mg tablet.    Routing to provider because labs out of range and LOV was 04/20/17    Patient stated that he only has a couple of pills left.    Patient declined to schedule appt while on the phone because he was at work. Informed him to call our clinic to set up an visit for his annual exam.    Jeannie Camacho RN  [facility]

## 2018-07-18 ENCOUNTER — RECORDS - HEALTHEAST (OUTPATIENT)
Dept: LAB | Facility: CLINIC | Age: 63
End: 2018-07-18

## 2018-07-18 LAB
ALBUMIN SERPL-MCNC: 3.7 G/DL (ref 3.5–5)
ALP SERPL-CCNC: 90 U/L (ref 45–120)
ALT SERPL W P-5'-P-CCNC: 26 U/L (ref 0–45)
ANION GAP SERPL CALCULATED.3IONS-SCNC: 11 MMOL/L (ref 5–18)
AST SERPL W P-5'-P-CCNC: 18 U/L (ref 0–40)
BASOPHILS # BLD AUTO: 0 THOU/UL (ref 0–0.2)
BASOPHILS NFR BLD AUTO: 0 % (ref 0–2)
BILIRUB SERPL-MCNC: 0.5 MG/DL (ref 0–1)
BUN SERPL-MCNC: 17 MG/DL (ref 8–22)
CALCIUM SERPL-MCNC: 8.9 MG/DL (ref 8.5–10.5)
CHLORIDE BLD-SCNC: 105 MMOL/L (ref 98–107)
CHOLEST SERPL-MCNC: 226 MG/DL
CO2 SERPL-SCNC: 23 MMOL/L (ref 22–31)
CREAT SERPL-MCNC: 0.98 MG/DL (ref 0.7–1.3)
EOSINOPHIL # BLD AUTO: 0.2 THOU/UL (ref 0–0.4)
EOSINOPHIL NFR BLD AUTO: 3 % (ref 0–6)
ERYTHROCYTE [DISTWIDTH] IN BLOOD BY AUTOMATED COUNT: 13.7 % (ref 11–14.5)
FASTING STATUS PATIENT QL REPORTED: ABNORMAL
GFR SERPL CREATININE-BSD FRML MDRD: >60 ML/MIN/1.73M2
GLUCOSE BLD-MCNC: 85 MG/DL (ref 70–125)
HCT VFR BLD AUTO: 44.7 % (ref 40–54)
HDLC SERPL-MCNC: 51 MG/DL
HGB BLD-MCNC: 15.5 G/DL (ref 14–18)
LDLC SERPL CALC-MCNC: 139 MG/DL
LYMPHOCYTES # BLD AUTO: 1.5 THOU/UL (ref 0.8–4.4)
LYMPHOCYTES NFR BLD AUTO: 22 % (ref 20–40)
MCH RBC QN AUTO: 31.8 PG (ref 27–34)
MCHC RBC AUTO-ENTMCNC: 34.7 G/DL (ref 32–36)
MCV RBC AUTO: 92 FL (ref 80–100)
MONOCYTES # BLD AUTO: 0.5 THOU/UL (ref 0–0.9)
MONOCYTES NFR BLD AUTO: 8 % (ref 2–10)
NEUTROPHILS # BLD AUTO: 4.5 THOU/UL (ref 2–7.7)
NEUTROPHILS NFR BLD AUTO: 67 % (ref 50–70)
PLATELET # BLD AUTO: 278 THOU/UL (ref 140–440)
PMV BLD AUTO: 10.5 FL (ref 8.5–12.5)
POTASSIUM BLD-SCNC: 4.3 MMOL/L (ref 3.5–5)
PROT SERPL-MCNC: 6.3 G/DL (ref 6–8)
PSA SERPL-MCNC: 2.8 NG/ML (ref 0–4.5)
RBC # BLD AUTO: 4.88 MILL/UL (ref 4.4–6.2)
SODIUM SERPL-SCNC: 139 MMOL/L (ref 136–145)
TRIGL SERPL-MCNC: 180 MG/DL
WBC: 6.7 THOU/UL (ref 4–11)

## 2019-11-07 ENCOUNTER — RECORDS - HEALTHEAST (OUTPATIENT)
Dept: LAB | Facility: CLINIC | Age: 64
End: 2019-11-07

## 2019-11-07 LAB
ALBUMIN SERPL-MCNC: 3.8 G/DL (ref 3.5–5)
ALP SERPL-CCNC: 90 U/L (ref 45–120)
ALT SERPL W P-5'-P-CCNC: 20 U/L (ref 0–45)
ANION GAP SERPL CALCULATED.3IONS-SCNC: 7 MMOL/L (ref 5–18)
AST SERPL W P-5'-P-CCNC: 17 U/L (ref 0–40)
BASOPHILS # BLD AUTO: 0 THOU/UL (ref 0–0.2)
BASOPHILS NFR BLD AUTO: 1 % (ref 0–2)
BILIRUB SERPL-MCNC: 0.5 MG/DL (ref 0–1)
BUN SERPL-MCNC: 18 MG/DL (ref 8–22)
CALCIUM SERPL-MCNC: 9.1 MG/DL (ref 8.5–10.5)
CHLORIDE BLD-SCNC: 106 MMOL/L (ref 98–107)
CHOLEST SERPL-MCNC: 207 MG/DL
CO2 SERPL-SCNC: 26 MMOL/L (ref 22–31)
CREAT SERPL-MCNC: 0.97 MG/DL (ref 0.7–1.3)
EOSINOPHIL # BLD AUTO: 0.2 THOU/UL (ref 0–0.4)
EOSINOPHIL NFR BLD AUTO: 3 % (ref 0–6)
ERYTHROCYTE [DISTWIDTH] IN BLOOD BY AUTOMATED COUNT: 13.6 % (ref 11–14.5)
FASTING STATUS PATIENT QL REPORTED: NO
GFR SERPL CREATININE-BSD FRML MDRD: >60 ML/MIN/1.73M2
GLUCOSE BLD-MCNC: 89 MG/DL (ref 70–125)
HCT VFR BLD AUTO: 47.6 % (ref 40–54)
HDLC SERPL-MCNC: 63 MG/DL
HGB BLD-MCNC: 15.3 G/DL (ref 14–18)
LDLC SERPL CALC-MCNC: 127 MG/DL
LYMPHOCYTES # BLD AUTO: 1.2 THOU/UL (ref 0.8–4.4)
LYMPHOCYTES NFR BLD AUTO: 25 % (ref 20–40)
MCH RBC QN AUTO: 30.4 PG (ref 27–34)
MCHC RBC AUTO-ENTMCNC: 32.1 G/DL (ref 32–36)
MCV RBC AUTO: 94 FL (ref 80–100)
MONOCYTES # BLD AUTO: 0.5 THOU/UL (ref 0–0.9)
MONOCYTES NFR BLD AUTO: 10 % (ref 2–10)
NEUTROPHILS # BLD AUTO: 3 THOU/UL (ref 2–7.7)
NEUTROPHILS NFR BLD AUTO: 61 % (ref 50–70)
PLATELET # BLD AUTO: 290 THOU/UL (ref 140–440)
PMV BLD AUTO: 10.2 FL (ref 8.5–12.5)
POTASSIUM BLD-SCNC: 4.5 MMOL/L (ref 3.5–5)
PROT SERPL-MCNC: 6.4 G/DL (ref 6–8)
PSA SERPL-MCNC: 2.7 NG/ML (ref 0–4.5)
RBC # BLD AUTO: 5.04 MILL/UL (ref 4.4–6.2)
SODIUM SERPL-SCNC: 139 MMOL/L (ref 136–145)
TRIGL SERPL-MCNC: 83 MG/DL
WBC: 4.9 THOU/UL (ref 4–11)

## 2019-11-08 LAB — HBA1C MFR BLD: 5.7 % (ref 4.2–6.1)

## 2020-02-04 ENCOUNTER — RECORDS - HEALTHEAST (OUTPATIENT)
Dept: LAB | Facility: CLINIC | Age: 65
End: 2020-02-04

## 2020-02-04 LAB
ALBUMIN SERPL-MCNC: 3.6 G/DL (ref 3.5–5)
ALP SERPL-CCNC: 93 U/L (ref 45–120)
ALT SERPL W P-5'-P-CCNC: 28 U/L (ref 0–45)
ANION GAP SERPL CALCULATED.3IONS-SCNC: 8 MMOL/L (ref 5–18)
AST SERPL W P-5'-P-CCNC: 15 U/L (ref 0–40)
BASOPHILS # BLD AUTO: 0.1 THOU/UL (ref 0–0.2)
BASOPHILS NFR BLD AUTO: 1 % (ref 0–2)
BILIRUB SERPL-MCNC: 0.4 MG/DL (ref 0–1)
BUN SERPL-MCNC: 18 MG/DL (ref 8–22)
C REACTIVE PROTEIN LHE: 0.3 MG/DL (ref 0–0.8)
CALCIUM SERPL-MCNC: 9.2 MG/DL (ref 8.5–10.5)
CHLORIDE BLD-SCNC: 102 MMOL/L (ref 98–107)
CO2 SERPL-SCNC: 28 MMOL/L (ref 22–31)
CREAT SERPL-MCNC: 0.97 MG/DL (ref 0.7–1.3)
EOSINOPHIL # BLD AUTO: 0.2 THOU/UL (ref 0–0.4)
EOSINOPHIL NFR BLD AUTO: 3 % (ref 0–6)
ERYTHROCYTE [DISTWIDTH] IN BLOOD BY AUTOMATED COUNT: 13.5 % (ref 11–14.5)
GFR SERPL CREATININE-BSD FRML MDRD: >60 ML/MIN/1.73M2
GLUCOSE BLD-MCNC: 96 MG/DL (ref 70–125)
HCT VFR BLD AUTO: 46.7 % (ref 40–54)
HGB BLD-MCNC: 15.3 G/DL (ref 14–18)
LYMPHOCYTES # BLD AUTO: 1.9 THOU/UL (ref 0.8–4.4)
LYMPHOCYTES NFR BLD AUTO: 27 % (ref 20–40)
MCH RBC QN AUTO: 30.9 PG (ref 27–34)
MCHC RBC AUTO-ENTMCNC: 32.8 G/DL (ref 32–36)
MCV RBC AUTO: 94 FL (ref 80–100)
MONOCYTES # BLD AUTO: 0.6 THOU/UL (ref 0–0.9)
MONOCYTES NFR BLD AUTO: 8 % (ref 2–10)
NEUTROPHILS # BLD AUTO: 4.2 THOU/UL (ref 2–7.7)
NEUTROPHILS NFR BLD AUTO: 61 % (ref 50–70)
PLATELET # BLD AUTO: 315 THOU/UL (ref 140–440)
PMV BLD AUTO: 9.9 FL (ref 8.5–12.5)
POTASSIUM BLD-SCNC: 4.7 MMOL/L (ref 3.5–5)
PROT SERPL-MCNC: 6.2 G/DL (ref 6–8)
RBC # BLD AUTO: 4.95 MILL/UL (ref 4.4–6.2)
SODIUM SERPL-SCNC: 138 MMOL/L (ref 136–145)
T4 FREE SERPL-MCNC: 1.1 NG/DL (ref 0.7–1.8)
TSH SERPL DL<=0.005 MIU/L-ACNC: 0.76 UIU/ML (ref 0.3–5)
WBC: 6.9 THOU/UL (ref 4–11)

## 2020-02-05 LAB — HBA1C MFR BLD: 5.8 % (ref 4.2–6.1)

## 2021-01-29 ENCOUNTER — RECORDS - HEALTHEAST (OUTPATIENT)
Dept: LAB | Facility: CLINIC | Age: 66
End: 2021-01-29

## 2021-01-29 LAB
ALBUMIN SERPL-MCNC: 3.6 G/DL (ref 3.5–5)
ALBUMIN UR-MCNC: NEGATIVE MG/DL
ALP SERPL-CCNC: 92 U/L (ref 45–120)
ALT SERPL W P-5'-P-CCNC: 63 U/L (ref 0–45)
ANION GAP SERPL CALCULATED.3IONS-SCNC: 4 MMOL/L (ref 5–18)
APPEARANCE UR: CLEAR
AST SERPL W P-5'-P-CCNC: 39 U/L (ref 0–40)
BASOPHILS # BLD AUTO: 0.1 THOU/UL (ref 0–0.2)
BASOPHILS NFR BLD AUTO: 1 % (ref 0–2)
BILIRUB SERPL-MCNC: 0.4 MG/DL (ref 0–1)
BILIRUB UR QL STRIP: NEGATIVE
BUN SERPL-MCNC: 14 MG/DL (ref 8–22)
CALCIUM SERPL-MCNC: 9.1 MG/DL (ref 8.5–10.5)
CHLORIDE BLD-SCNC: 104 MMOL/L (ref 98–107)
CHOLEST SERPL-MCNC: 217 MG/DL
CO2 SERPL-SCNC: 31 MMOL/L (ref 22–31)
COLOR UR AUTO: YELLOW
CREAT SERPL-MCNC: 1.14 MG/DL (ref 0.7–1.3)
EOSINOPHIL # BLD AUTO: 0.2 THOU/UL (ref 0–0.4)
EOSINOPHIL NFR BLD AUTO: 3 % (ref 0–6)
ERYTHROCYTE [DISTWIDTH] IN BLOOD BY AUTOMATED COUNT: 13.6 % (ref 11–14.5)
FASTING STATUS PATIENT QL REPORTED: ABNORMAL
GFR SERPL CREATININE-BSD FRML MDRD: >60 ML/MIN/1.73M2
GLUCOSE BLD-MCNC: 95 MG/DL (ref 70–125)
GLUCOSE UR STRIP-MCNC: NEGATIVE MG/DL
HCT VFR BLD AUTO: 48.1 % (ref 40–54)
HDLC SERPL-MCNC: 68 MG/DL
HGB BLD-MCNC: 15.6 G/DL (ref 14–18)
HGB UR QL STRIP: NEGATIVE
IMM GRANULOCYTES # BLD: 0 THOU/UL
IMM GRANULOCYTES NFR BLD: 0 %
KETONES UR STRIP-MCNC: NEGATIVE MG/DL
LDLC SERPL CALC-MCNC: 122 MG/DL
LEUKOCYTE ESTERASE UR QL STRIP: NEGATIVE
LYMPHOCYTES # BLD AUTO: 1.8 THOU/UL (ref 0.8–4.4)
LYMPHOCYTES NFR BLD AUTO: 27 % (ref 20–40)
MCH RBC QN AUTO: 30.3 PG (ref 27–34)
MCHC RBC AUTO-ENTMCNC: 32.4 G/DL (ref 32–36)
MCV RBC AUTO: 93 FL (ref 80–100)
MONOCYTES # BLD AUTO: 0.6 THOU/UL (ref 0–0.9)
MONOCYTES NFR BLD AUTO: 9 % (ref 2–10)
NEUTROPHILS # BLD AUTO: 4 THOU/UL (ref 2–7.7)
NEUTROPHILS NFR BLD AUTO: 60 % (ref 50–70)
NITRATE UR QL: NEGATIVE
PH UR STRIP: 6 [PH] (ref 4.5–8)
PLATELET # BLD AUTO: 296 THOU/UL (ref 140–440)
PMV BLD AUTO: 10.1 FL (ref 8.5–12.5)
POTASSIUM BLD-SCNC: 5.1 MMOL/L (ref 3.5–5)
PROT SERPL-MCNC: 6.5 G/DL (ref 6–8)
PSA SERPL-MCNC: 2.3 NG/ML (ref 0–4.5)
RBC # BLD AUTO: 5.15 MILL/UL (ref 4.4–6.2)
SODIUM SERPL-SCNC: 139 MMOL/L (ref 136–145)
SP GR UR STRIP: 1.01 (ref 1–1.03)
TRIGL SERPL-MCNC: 137 MG/DL
UROBILINOGEN UR STRIP-ACNC: NORMAL
WBC: 6.7 THOU/UL (ref 4–11)

## 2022-02-03 ENCOUNTER — LAB REQUISITION (OUTPATIENT)
Dept: LAB | Facility: CLINIC | Age: 67
End: 2022-02-03
Payer: COMMERCIAL

## 2022-02-03 DIAGNOSIS — R82.998 OTHER ABNORMAL FINDINGS IN URINE: ICD-10-CM

## 2022-02-03 DIAGNOSIS — I10 ESSENTIAL (PRIMARY) HYPERTENSION: ICD-10-CM

## 2022-02-03 DIAGNOSIS — Z12.5 ENCOUNTER FOR SCREENING FOR MALIGNANT NEOPLASM OF PROSTATE: ICD-10-CM

## 2022-02-03 LAB
ALBUMIN SERPL-MCNC: 3.7 G/DL (ref 3.5–5)
ALBUMIN UR-MCNC: NEGATIVE MG/DL
ALP SERPL-CCNC: 103 U/L (ref 45–120)
ALT SERPL W P-5'-P-CCNC: 29 U/L (ref 0–45)
ANION GAP SERPL CALCULATED.3IONS-SCNC: 10 MMOL/L (ref 5–18)
APPEARANCE UR: CLEAR
AST SERPL W P-5'-P-CCNC: 21 U/L (ref 0–40)
BASOPHILS # BLD AUTO: 0.1 10E3/UL (ref 0–0.2)
BASOPHILS NFR BLD AUTO: 1 %
BILIRUB SERPL-MCNC: 0.7 MG/DL (ref 0–1)
BILIRUB UR QL STRIP: NEGATIVE
BUN SERPL-MCNC: 21 MG/DL (ref 8–22)
CALCIUM SERPL-MCNC: 9.2 MG/DL (ref 8.5–10.5)
CHLORIDE BLD-SCNC: 106 MMOL/L (ref 98–107)
CHOLEST SERPL-MCNC: 224 MG/DL
CO2 SERPL-SCNC: 24 MMOL/L (ref 22–31)
COLOR UR AUTO: ABNORMAL
CREAT SERPL-MCNC: 1.04 MG/DL (ref 0.7–1.3)
EOSINOPHIL # BLD AUTO: 0.3 10E3/UL (ref 0–0.7)
EOSINOPHIL NFR BLD AUTO: 6 %
ERYTHROCYTE [DISTWIDTH] IN BLOOD BY AUTOMATED COUNT: 13.7 % (ref 10–15)
FASTING STATUS PATIENT QL REPORTED: YES
GFR SERPL CREATININE-BSD FRML MDRD: 79 ML/MIN/1.73M2
GLUCOSE BLD-MCNC: 110 MG/DL (ref 70–125)
GLUCOSE UR STRIP-MCNC: NEGATIVE MG/DL
HCT VFR BLD AUTO: 47.4 % (ref 40–53)
HDLC SERPL-MCNC: 73 MG/DL
HGB BLD-MCNC: 15.9 G/DL (ref 13.3–17.7)
HGB UR QL STRIP: NEGATIVE
IMM GRANULOCYTES # BLD: 0 10E3/UL
IMM GRANULOCYTES NFR BLD: 0 %
KETONES UR STRIP-MCNC: NEGATIVE MG/DL
LDLC SERPL CALC-MCNC: 134 MG/DL
LEUKOCYTE ESTERASE UR QL STRIP: NEGATIVE
LYMPHOCYTES # BLD AUTO: 1.5 10E3/UL (ref 0.8–5.3)
LYMPHOCYTES NFR BLD AUTO: 31 %
MCH RBC QN AUTO: 30.5 PG (ref 26.5–33)
MCHC RBC AUTO-ENTMCNC: 33.5 G/DL (ref 31.5–36.5)
MCV RBC AUTO: 91 FL (ref 78–100)
MONOCYTES # BLD AUTO: 0.5 10E3/UL (ref 0–1.3)
MONOCYTES NFR BLD AUTO: 10 %
MUCOUS THREADS #/AREA URNS LPF: PRESENT /LPF
NEUTROPHILS # BLD AUTO: 2.6 10E3/UL (ref 1.6–8.3)
NEUTROPHILS NFR BLD AUTO: 52 %
NITRATE UR QL: NEGATIVE
NRBC # BLD AUTO: 0 10E3/UL
NRBC BLD AUTO-RTO: 0 /100
PH UR STRIP: 7 [PH] (ref 5–7)
PLATELET # BLD AUTO: 303 10E3/UL (ref 150–450)
POTASSIUM BLD-SCNC: 4.7 MMOL/L (ref 3.5–5)
PROT SERPL-MCNC: 6.6 G/DL (ref 6–8)
PSA SERPL-MCNC: 2.91 UG/L (ref 0–4.5)
RBC # BLD AUTO: 5.21 10E6/UL (ref 4.4–5.9)
RBC URINE: <1 /HPF
SODIUM SERPL-SCNC: 140 MMOL/L (ref 136–145)
SP GR UR STRIP: 1.02 (ref 1–1.03)
SQUAMOUS EPITHELIAL: <1 /HPF
TRIGL SERPL-MCNC: 84 MG/DL
UROBILINOGEN UR STRIP-MCNC: <2 MG/DL
WBC # BLD AUTO: 5 10E3/UL (ref 4–11)
WBC URINE: <1 /HPF

## 2022-02-03 PROCEDURE — 80053 COMPREHEN METABOLIC PANEL: CPT | Mod: ORL | Performed by: FAMILY MEDICINE

## 2022-02-03 PROCEDURE — 80061 LIPID PANEL: CPT | Mod: ORL | Performed by: FAMILY MEDICINE

## 2022-02-03 PROCEDURE — G0103 PSA SCREENING: HCPCS | Mod: ORL | Performed by: FAMILY MEDICINE

## 2022-02-03 PROCEDURE — 81001 URINALYSIS AUTO W/SCOPE: CPT | Mod: ORL | Performed by: FAMILY MEDICINE

## 2022-02-03 PROCEDURE — 85025 COMPLETE CBC W/AUTO DIFF WBC: CPT | Mod: ORL | Performed by: FAMILY MEDICINE

## 2022-03-10 ENCOUNTER — TRANSCRIBE ORDERS (OUTPATIENT)
Dept: OTHER | Age: 67
End: 2022-03-10
Payer: COMMERCIAL

## 2022-03-10 DIAGNOSIS — M54.2 NECK PAIN: Primary | ICD-10-CM

## 2023-02-27 ENCOUNTER — LAB REQUISITION (OUTPATIENT)
Dept: LAB | Facility: CLINIC | Age: 68
End: 2023-02-27
Payer: COMMERCIAL

## 2023-02-27 DIAGNOSIS — Z13.6 ENCOUNTER FOR SCREENING FOR CARDIOVASCULAR DISORDERS: ICD-10-CM

## 2023-02-27 DIAGNOSIS — Z13.220 ENCOUNTER FOR SCREENING FOR LIPOID DISORDERS: ICD-10-CM

## 2023-02-27 DIAGNOSIS — R82.998 OTHER ABNORMAL FINDINGS IN URINE: ICD-10-CM

## 2023-02-27 DIAGNOSIS — R53.83 OTHER FATIGUE: ICD-10-CM

## 2023-02-27 DIAGNOSIS — Z12.5 ENCOUNTER FOR SCREENING FOR MALIGNANT NEOPLASM OF PROSTATE: ICD-10-CM

## 2023-02-27 LAB
ALBUMIN SERPL BCG-MCNC: 4 G/DL (ref 3.5–5.2)
ALBUMIN UR-MCNC: NEGATIVE MG/DL
ALP SERPL-CCNC: 108 U/L (ref 40–129)
ALT SERPL W P-5'-P-CCNC: 16 U/L (ref 10–50)
ANION GAP SERPL CALCULATED.3IONS-SCNC: 11 MMOL/L (ref 7–15)
APPEARANCE UR: CLEAR
AST SERPL W P-5'-P-CCNC: 16 U/L (ref 10–50)
BASOPHILS # BLD AUTO: 0.1 10E3/UL (ref 0–0.2)
BASOPHILS NFR BLD AUTO: 1 %
BILIRUB SERPL-MCNC: 0.2 MG/DL
BILIRUB UR QL STRIP: NEGATIVE
BUN SERPL-MCNC: 15.5 MG/DL (ref 8–23)
CALCIUM SERPL-MCNC: 9.2 MG/DL (ref 8.8–10.2)
CHLORIDE SERPL-SCNC: 104 MMOL/L (ref 98–107)
CHOLEST SERPL-MCNC: 198 MG/DL
COLOR UR AUTO: NORMAL
CREAT SERPL-MCNC: 1.11 MG/DL (ref 0.67–1.17)
DEPRECATED HCO3 PLAS-SCNC: 25 MMOL/L (ref 22–29)
EOSINOPHIL # BLD AUTO: 0.2 10E3/UL (ref 0–0.7)
EOSINOPHIL NFR BLD AUTO: 4 %
ERYTHROCYTE [DISTWIDTH] IN BLOOD BY AUTOMATED COUNT: 13.8 % (ref 10–15)
GFR SERPL CREATININE-BSD FRML MDRD: 72 ML/MIN/1.73M2
GLUCOSE SERPL-MCNC: 99 MG/DL (ref 70–99)
GLUCOSE UR STRIP-MCNC: NEGATIVE MG/DL
HCT VFR BLD AUTO: 49.9 % (ref 40–53)
HDLC SERPL-MCNC: 64 MG/DL
HGB BLD-MCNC: 15.7 G/DL (ref 13.3–17.7)
HGB UR QL STRIP: NEGATIVE
IMM GRANULOCYTES # BLD: 0 10E3/UL
IMM GRANULOCYTES NFR BLD: 0 %
KETONES UR STRIP-MCNC: NEGATIVE MG/DL
LDLC SERPL CALC-MCNC: 112 MG/DL
LEUKOCYTE ESTERASE UR QL STRIP: NEGATIVE
LYMPHOCYTES # BLD AUTO: 1.3 10E3/UL (ref 0.8–5.3)
LYMPHOCYTES NFR BLD AUTO: 27 %
MCH RBC QN AUTO: 29.7 PG (ref 26.5–33)
MCHC RBC AUTO-ENTMCNC: 31.5 G/DL (ref 31.5–36.5)
MCV RBC AUTO: 95 FL (ref 78–100)
MONOCYTES # BLD AUTO: 0.4 10E3/UL (ref 0–1.3)
MONOCYTES NFR BLD AUTO: 8 %
NEUTROPHILS # BLD AUTO: 2.7 10E3/UL (ref 1.6–8.3)
NEUTROPHILS NFR BLD AUTO: 60 %
NITRATE UR QL: NEGATIVE
NONHDLC SERPL-MCNC: 134 MG/DL
NRBC # BLD AUTO: 0 10E3/UL
NRBC BLD AUTO-RTO: 0 /100
PH UR STRIP: 5 [PH] (ref 5–7)
PLATELET # BLD AUTO: 309 10E3/UL (ref 150–450)
POTASSIUM SERPL-SCNC: 4.5 MMOL/L (ref 3.4–5.3)
PROT SERPL-MCNC: 6.3 G/DL (ref 6.4–8.3)
PSA SERPL-MCNC: 2.73 NG/ML (ref 0–4.5)
RBC # BLD AUTO: 5.28 10E6/UL (ref 4.4–5.9)
RBC URINE: 1 /HPF
SODIUM SERPL-SCNC: 140 MMOL/L (ref 136–145)
SP GR UR STRIP: 1.01 (ref 1–1.03)
TRIGL SERPL-MCNC: 109 MG/DL
UROBILINOGEN UR STRIP-MCNC: NORMAL MG/DL
WBC # BLD AUTO: 4.6 10E3/UL (ref 4–11)
WBC URINE: <1 /HPF

## 2023-02-27 PROCEDURE — G0103 PSA SCREENING: HCPCS | Mod: ORL | Performed by: FAMILY MEDICINE

## 2023-02-27 PROCEDURE — 81001 URINALYSIS AUTO W/SCOPE: CPT | Mod: ORL | Performed by: FAMILY MEDICINE

## 2023-02-27 PROCEDURE — 80061 LIPID PANEL: CPT | Mod: ORL | Performed by: FAMILY MEDICINE

## 2023-02-27 PROCEDURE — 80053 COMPREHEN METABOLIC PANEL: CPT | Mod: ORL | Performed by: FAMILY MEDICINE

## 2023-02-27 PROCEDURE — 85025 COMPLETE CBC W/AUTO DIFF WBC: CPT | Mod: ORL | Performed by: FAMILY MEDICINE

## 2023-11-27 ENCOUNTER — MEDICAL CORRESPONDENCE (OUTPATIENT)
Dept: HEALTH INFORMATION MANAGEMENT | Facility: CLINIC | Age: 68
End: 2023-11-27
Payer: COMMERCIAL

## 2023-11-27 ENCOUNTER — TRANSFERRED RECORDS (OUTPATIENT)
Dept: HEALTH INFORMATION MANAGEMENT | Facility: CLINIC | Age: 68
End: 2023-11-27
Payer: COMMERCIAL

## 2023-11-28 ENCOUNTER — TRANSCRIBE ORDERS (OUTPATIENT)
Dept: OTHER | Age: 68
End: 2023-11-28

## 2023-11-28 DIAGNOSIS — R31.9 HEMATURIA, UNSPECIFIED TYPE: Primary | ICD-10-CM

## 2023-12-13 ENCOUNTER — VIRTUAL VISIT (OUTPATIENT)
Dept: UROLOGY | Facility: CLINIC | Age: 68
End: 2023-12-13
Attending: NURSE PRACTITIONER
Payer: COMMERCIAL

## 2023-12-13 DIAGNOSIS — R31.0 GROSS HEMATURIA: ICD-10-CM

## 2023-12-13 PROCEDURE — 99204 OFFICE O/P NEW MOD 45 MIN: CPT | Mod: VID | Performed by: PHYSICIAN ASSISTANT

## 2023-12-13 ASSESSMENT — PAIN SCALES - GENERAL: PAINLEVEL: NO PAIN (0)

## 2023-12-13 NOTE — NURSING NOTE
Is the patient currently in the state of [place]? YES    Visit mode:VIDEO    If the visit is dropped, the patient can be reconnected by: VIDEO VISIT: Text to cell phone:   Telephone Information:   Mobile [phone]       Will anyone else be joining the visit? NO  (If patient encounters technical issues they should call [phone] :[phone])    How would you like to obtain your AVS? MyChart    Are changes needed to the allergy or medication list? No    Reason for visit: No chief complaint on file.    Pacheco SCHULER

## 2023-12-13 NOTE — PATIENT INSTRUCTIONS
- Urine cytology to look for abnormal cells. Please make an appointment at your local [facility] lab to leave a urine sample ([phone]).  - CT scan of the kidneys. You can call [phone] to schedule this.  - My office will call you to arrange a cystoscopy with the  urologist to evaluate the interior of the bladder. Follow up as recommended by the urologist.    CYSTOSCOPY    What is a Cystoscopy?  This is a procedure done to check for problems inside the bladder.  Problems may include polyps (growths), tumors, inflammation (swelling and redness) and other concerns.    The Urologist inserts a thin tube (called a cystoscope) into the bladder.  The tube is about the size of a pencil.  We will give you numbing medicine to reduce the pain or discomfort you may feel.    The Urologist will be able to see inside the bladder by filling the bladder with water.  The water makes it easier to see any problems that may be present. You will have a sense to need to urinate and this is normal.       How should I get ready for the exam?  Nothing to do to prepare. You may eat normally the day of the exam. There is no sedation, so you may drive yourself to and from if you can drive.       Please tell your doctor if:  You have a history of urinary tract infections.  You know that you have a tumor in your bladder.  You have bleeding problems.  You have any allergies.  You are or may be pregnant.      What happens after the exam?  You may go back to your normal diet and activity as you feel ready.    For the next two days after the exam, you may notice:  Some blood in your urine.  Some burning when you urinate (use the toilet).  An urge to urinate more often.  Bladder spasms.    These are normal after the procedure. They should go away on their own after a day or two.      You can help to relieve the above listed symptoms by:  Drinking 6 to 8 large glasses of water each day (includes drinks at meals).  This will help clear the  urine.  Take warm baths to relieve pain and bladder spasms.  Do not add anything to the bath water.  You may take Tylenol (acetaminophen) per label instructions for discomfort.

## 2023-12-13 NOTE — PROGRESS NOTES
How would you like to obtain your AVS? [employer] System[employer]art  If the video visit is dropped, the invitation should be resent by: Text to cell phone: [phone]  Will anyone else be joining your video visit? No          Video-Visit Details    Type of service:  Video Visit     Originating Location (pt. Location): Home    Distant Location (provider location):  On-site  Platform used for Video Visit: [employer]  Start time: 1:45pm  End time: 2:00pm     CC: Hematuria.    HPI: It is a pleasure to see Mr. Alex Gaviria, a pleasant 68 year old male seen today via billable video visit, in consultation from Anay Antonio NP, for evaluation of gross hematuria (Clot, dark red). Seen in UC a couple weeks ago and prescribed 7 days of cipro (took one tab and told to stop because UC neg). Complete visit records and lab results are not avail at the time of our visit.     The patient denies any further gross hematuria. Cigar smoking history. Voids with some difficulty (urgency, hesitancy, nocturia x 0-1), but strong stream.  He currently denies any dysuria, pyuria, hesitancy, intermittency, feelings of incomplete emptying, or any recent history of urinary tract infections or stones.    PSA up to date: 2.73. Father had hx of prostate cancer.    Hematuria Risk Factors:  Age >40: Yes   Smoking history: CIgars at times per EMR  Occupational exposure to chemicals or dyes (ie, benzenes, aromatic amines): no  History of urologic disorder or disease: no  History of irritative voiding symptoms: no  History of urinary tract infection: no  Analgesic abuse: no  History of pelvic irradiation: no    Past Medical History:   Diagnosis Date    Chronic back pain     Degenerative disc disease, cervical 1990s    laminectomy    Hearing loss 2008    aids    Inguinal hernia without mention of obstruction or gangrene, unilateral or unspecified, (not specified as recurrent) 6/2013    Left    PONV (postoperative nausea and vomiting)     Rib fracture Oct 2012    Spinal  stenosis in cervical region     surgery     Past Surgical History:   Procedure Laterality Date    LAPAROSCOPIC HERNIORRHAPHY INGUINAL  6/21/2013    Procedure: LAPAROSCOPIC HERNIORRHAPHY INGUINAL;  Left Laparoscopic Inguinal Hernia Repair with Mesh ;  Surgeon: Bharathi Vance MD;  Location: [facility]    NECK SURGERY  1998    C5    spinal stenosis  2008    C3,4,5 plate     Current Outpatient Medications   Medication Sig Dispense Refill    amitriptyline (ELAVIL) 25 MG tablet TAKE 1 TABLET(25 MG) BY MOUTH AT BEDTIME 90 tablet 0    HYDROcodone-acetaminophen (VICODIN) 5-500 MG per tablet Take 1 tablet by mouth Reported on 5/9/2017      lisinopril (PRINIVIL/ZESTRIL) 10 MG tablet TAKE 1 TABLET BY MOUTH EVERY DAY 90 tablet 0     Allergies   Allergen Reactions    No Known Allergies      FAMILY HISTORY: There is reported history of genitourinary carcinoma (father, prostate cancer).  There is no history of urolithiasis.    Social History     Socioeconomic History    Marital status:      Spouse name: Not on file    Number of children: Not on file    Years of education: Not on file    Highest education level: Not on file   Occupational History    Not on file   Tobacco Use    Smoking status: Some Days     Types: Cigars    Smokeless tobacco: Never   Substance and Sexual Activity    Alcohol use: Yes     Comment: 3 beers per day    Drug use: No    Sexual activity: Yes     Partners: Female     Birth control/protection: Surgical   Other Topics Concern    Parent/sibling w/ CABG, MI or angioplasty before 65F 55M? No   Social History Narrative    Not on file     Social Determinants of Health     Financial Resource Strain: Not on file   Food Insecurity: Not on file   Transportation Needs: Not on file   Physical Activity: Not on file   Stress: Not on file   Social Connections: Not on file   Interpersonal Safety: Not on file   Housing Stability: Not on file       ROS: A 5 point ROS was obtained and  otherwise negative except for  that outlined above in the HPI.    PHYSICAL EXAM:   Vitals:     PSYCH: NAD  EYES: EOMI  NEURO: AAO x3      IMAGING: none    ASSESSMENT and PLAN:    Mr. Alex Gaviria is a pleasant 68 year old male with gross hematuria.  The differential diagnosis at this point includes stone disease, infection, BPH, prostatitis, urothelial malignancy, renal disorder versus another yet unknown diagnosis.    At this time, recommend proceeding with comprehensive hematuria evaluation to include:  - Urine cytology to look for cells concerning for malignancy.  - CT urogram for upper tract imaging.  - Cystoscopy with the first available urologist to evaluate the interior of the bladder. Follow up for hematuria as recommended by urologist performing cystoscopic evaluation.  -PSA up to date    Thank you for allowing me to participate in Mr. Gaviria's care.      25 minutes spent on the date of the encounter doing chart review, review of outside records, review of test results, interpretation of tests, patient visit and documentation.     Enid Sheets PA-C  [facility]

## 2023-12-21 ENCOUNTER — TELEPHONE (OUTPATIENT)
Dept: UROLOGY | Facility: CLINIC | Age: 68
End: 2023-12-21
Payer: COMMERCIAL

## 2023-12-21 NOTE — TELEPHONE ENCOUNTER
Left Voicemail (1st Attempt) for the patient to call back and schedule the following:    Appointment type: Cystoscopy  Provider: Dr. Latham, Dr. Randolph, Dr. Perez, or Dr. Blanton  Return date: Next [facility] at [facility]  Specialty phone number: [phone]  Additional appointment(s) needed: N/A  Additonal Notes: Gross hematuria Cysto, prefers [facility]

## 2023-12-22 ENCOUNTER — PRE VISIT (OUTPATIENT)
Dept: UROLOGY | Facility: CLINIC | Age: 68
End: 2023-12-22
Payer: COMMERCIAL

## 2023-12-22 NOTE — CONFIDENTIAL NOTE
Reason for visit: cystoscopy     Relevant information: gross hematuria    Records/imaging/labs/orders: CT urogram scheduled 12/27/2023, urine cytology to be scheduled    At Room[facility]: have a luer-avila syringe and urine cup available during cysto    Nadia Healy  12/22/2023  7:54 AM

## 2023-12-24 ENCOUNTER — HEALTH MAINTENANCE LETTER (OUTPATIENT)
Age: 68
End: 2023-12-24

## 2023-12-27 ENCOUNTER — LAB (OUTPATIENT)
Dept: LAB | Facility: CLINIC | Age: 68
End: 2023-12-27
Attending: PHYSICIAN ASSISTANT
Payer: COMMERCIAL

## 2023-12-27 ENCOUNTER — HOSPITAL ENCOUNTER (OUTPATIENT)
Dept: CT IMAGING | Facility: CLINIC | Age: 68
Discharge: HOME | End: 2023-12-27
Attending: PHYSICIAN ASSISTANT
Payer: COMMERCIAL

## 2023-12-27 DIAGNOSIS — R31.0 GROSS HEMATURIA: ICD-10-CM

## 2023-12-27 PROCEDURE — 88112 CYTOPATH CELL ENHANCE TECH: CPT | Mod: TC

## 2023-12-27 PROCEDURE — 250N000011 HC RX IP 250 OP 636: Performed by: PHYSICIAN ASSISTANT

## 2023-12-27 PROCEDURE — 250N000009 HC RX 250: Performed by: PHYSICIAN ASSISTANT

## 2023-12-27 PROCEDURE — 74178 CT ABD&PLV WO CNTR FLWD CNTR: CPT

## 2023-12-27 RX ORDER — IOPAMIDOL 755 MG/ML
120 INJECTION, SOLUTION INTRAVASCULAR ONCE
Status: COMPLETED | OUTPATIENT
Start: 2023-12-27 | End: 2023-12-27

## 2023-12-27 RX ADMIN — IOPAMIDOL 120 ML: 755 INJECTION, SOLUTION INTRAVENOUS at 08:02

## 2023-12-27 RX ADMIN — SODIUM CHLORIDE 100 ML: 9 INJECTION, SOLUTION INTRAVENOUS at 08:02

## 2023-12-29 LAB
PATH REPORT.COMMENTS IMP SPEC: NORMAL
PATH REPORT.FINAL DX SPEC: NORMAL
PATH REPORT.GROSS SPEC: NORMAL
PATH REPORT.MICROSCOPIC SPEC OTHER STN: NORMAL
PATH REPORT.RELEVANT HX SPEC: NORMAL

## 2023-12-29 PROCEDURE — 88112 CYTOPATH CELL ENHANCE TECH: CPT | Mod: 26

## 2024-01-03 ENCOUNTER — OFFICE VISIT (OUTPATIENT)
Dept: UROLOGY | Facility: CLINIC | Age: 69
End: 2024-01-03
Payer: COMMERCIAL

## 2024-01-03 VITALS — SYSTOLIC BLOOD PRESSURE: 139 MMHG | HEART RATE: 94 BPM | DIASTOLIC BLOOD PRESSURE: 94 MMHG | OXYGEN SATURATION: 96 %

## 2024-01-03 DIAGNOSIS — R31.0 GROSS HEMATURIA: Primary | ICD-10-CM

## 2024-01-03 PROCEDURE — 52000 CYSTOURETHROSCOPY: CPT | Performed by: UROLOGY

## 2024-01-03 ASSESSMENT — PAIN SCALES - GENERAL: PAINLEVEL: NO PAIN (0)

## 2024-01-03 NOTE — LETTER
1/3/2024       RE: Alex Gaviria  [address] [place] [address]     Dear Colleague,    Thank you for referring your patient, Alex Gaviria, to the [facility] [place] at [facility]. Please see a copy of my visit note below.    Cystoscopy procedure     Pre procedural diagnosis gross hematuria  Post procedural diagnosis Same     Indication 68 year old with history of gross hematuria and unremarkable CT urogram who is here for diagnostic cystoscopy        Procedure:      After sterile preparation and draping of the patient,  a 16-Japanese flexible cystoscope was introduced via the urethra.  It was passed without difficulty into the bladder.  The urethra was open without evidence of stricture.  The ureteral orifices were orthotopic.  The bladder mucosa was evaluated using both antegrade and retroflex views and this showed no evidence of any lesions or trabeculation. Scope was then removed and patient tolerated the procedure well.       Lindsey Latham MD   Department of Urology   [facility]

## 2024-01-03 NOTE — PROGRESS NOTES
Cystoscopy procedure     Pre procedural diagnosis gross hematuria  Post procedural diagnosis Same     Indication 68 year old with history of gross hematuria and unremarkable CT urogram who is here for diagnostic cystoscopy        Procedure:      After sterile preparation and draping of the patient,  a 16-Czech flexible cystoscope was introduced via the urethra.  It was passed without difficulty into the bladder.  The urethra was open without evidence of stricture.  The ureteral orifices were orthotopic.  The bladder mucosa was evaluated using both antegrade and retroflex views and this showed no evidence of any lesions or trabeculation. Scope was then removed and patient tolerated the procedure well.       Lindsey Latham MD   Department of Urology   [facility]

## 2024-01-03 NOTE — NURSING NOTE
Chief Complaint   Patient presents with    Cystoscopy       Blood pressure (!) 139/94, pulse 94, SpO2 96%. There is no height or weight on file to calculate BMI.    Patient Active Problem List   Diagnosis    CARDIOVASCULAR SCREENING; LDL GOAL LESS THAN 130    Hernia    Chronic back pain    Degenerative disc disease, cervical    Hypertension goal BP (blood pressure) < 140/90       Allergies   Allergen Reactions    No Known Allergies        Current Outpatient Medications   Medication Sig Dispense Refill    amitriptyline (ELAVIL) 25 MG tablet TAKE 1 TABLET(25 MG) BY MOUTH AT BEDTIME 90 tablet 0    HYDROcodone-acetaminophen (VICODIN) 5-500 MG per tablet Take 1 tablet by mouth Reported on 2017      lisinopril (PRINIVIL/ZESTRIL) 10 MG tablet TAKE 1 TABLET BY MOUTH EVERY DAY 90 tablet 0       Social History     Tobacco Use    Smoking status: Some Days     Types: Cigars    Smokeless tobacco: Never   Substance Use Topics    Alcohol use: Yes     Comment: 3 beers per day    Drug use: No       What to expect after the procedure reviewed with patient: yes    Nicole Lau LPN  1/3/2024  2:47 PM     Invasive Procedure Safety Checklist:    Procedure: cystoscopy    Action: Complete sections and checkboxes as appropriate.    Pre-procedure:  1. Patient ID Verified with 2 identifiers (Daisy and  or MRN) : YES    2. Procedure and site verified with patient/designee (when able) : YES    3. Accurate consent documentation in medical record : YES    4. H&P (or appropriate assessment) documented in medical record : YES  H&P must be up to 30 days prior to procedure an updated within 24 hours of                 Procedure as applicable.     5. Relevant diagnostic and radiology test results appropriately labeled and displayed as applicable : YES    6. Blood products, implants, devices, and/or special equipment available for the procedure as applicable : YES    7. Procedure site(s) marked with provider initials [Exclusions: None] :  NO    8. Marking not required. Reason : Yes  Procedure does not require site marking    Time Out:     Time-Out performed immediately prior to starting procedure, including verbal and active participation of all team members addressing: YES    1. Correct patient identity.  2. Confirmed that the correct side and site are marked.  3. An accurate procedure to be done.  4. Agreement on the procedure to be done.  5. Correct patient position.  6. Relevant images and results are properly labeled and appropriately displayed.  7. The need to administer antibiotics or fluids for irrigation purposes during the procedure as applicable.  8. Safety precautions based on patient history or medication use.    During Procedure: Verification of correct person, site, and procedure occurs any time the responsibility for care of the patient is transferred to another member of the care team.    No medications administered during this procedure.    Nicole Lau LPN  January 3, 2024

## 2024-01-05 DIAGNOSIS — R91.8 PULMONARY NODULES: Primary | ICD-10-CM

## 2024-01-09 ENCOUNTER — HOSPITAL ENCOUNTER (OUTPATIENT)
Dept: CT IMAGING | Facility: CLINIC | Age: 69
Discharge: HOME | End: 2024-01-09
Attending: PHYSICIAN ASSISTANT | Admitting: PHYSICIAN ASSISTANT
Payer: COMMERCIAL

## 2024-01-09 DIAGNOSIS — R91.8 PULMONARY NODULES: ICD-10-CM

## 2024-01-09 PROCEDURE — 71250 CT THORAX DX C-: CPT

## 2024-04-11 ENCOUNTER — LAB REQUISITION (OUTPATIENT)
Dept: LAB | Facility: CLINIC | Age: 69
End: 2024-04-11
Payer: COMMERCIAL

## 2024-04-11 DIAGNOSIS — Z12.5 ENCOUNTER FOR SCREENING FOR MALIGNANT NEOPLASM OF PROSTATE: ICD-10-CM

## 2024-04-11 DIAGNOSIS — E78.5 HYPERLIPIDEMIA, UNSPECIFIED: ICD-10-CM

## 2024-04-11 LAB
BASOPHILS # BLD AUTO: 0 10E3/UL (ref 0–0.2)
BASOPHILS NFR BLD AUTO: 1 %
EOSINOPHIL # BLD AUTO: 0.2 10E3/UL (ref 0–0.7)
EOSINOPHIL NFR BLD AUTO: 4 %
ERYTHROCYTE [DISTWIDTH] IN BLOOD BY AUTOMATED COUNT: 13.9 % (ref 10–15)
HCT VFR BLD AUTO: 44.3 % (ref 40–53)
HGB BLD-MCNC: 14.7 G/DL (ref 13.3–17.7)
IMM GRANULOCYTES # BLD: 0 10E3/UL
IMM GRANULOCYTES NFR BLD: 0 %
LYMPHOCYTES # BLD AUTO: 1.5 10E3/UL (ref 0.8–5.3)
LYMPHOCYTES NFR BLD AUTO: 27 %
MCH RBC QN AUTO: 30.4 PG (ref 26.5–33)
MCHC RBC AUTO-ENTMCNC: 33.2 G/DL (ref 31.5–36.5)
MCV RBC AUTO: 92 FL (ref 78–100)
MONOCYTES # BLD AUTO: 0.5 10E3/UL (ref 0–1.3)
MONOCYTES NFR BLD AUTO: 9 %
NEUTROPHILS # BLD AUTO: 3.2 10E3/UL (ref 1.6–8.3)
NEUTROPHILS NFR BLD AUTO: 59 %
NRBC # BLD AUTO: 0 10E3/UL
NRBC BLD AUTO-RTO: 0 /100
PLATELET # BLD AUTO: 296 10E3/UL (ref 150–450)
RBC # BLD AUTO: 4.83 10E6/UL (ref 4.4–5.9)
WBC # BLD AUTO: 5.5 10E3/UL (ref 4–11)

## 2024-04-11 PROCEDURE — 80061 LIPID PANEL: CPT | Mod: ORL | Performed by: INTERNAL MEDICINE

## 2024-04-11 PROCEDURE — 85025 COMPLETE CBC W/AUTO DIFF WBC: CPT | Mod: ORL | Performed by: INTERNAL MEDICINE

## 2024-04-11 PROCEDURE — 80053 COMPREHEN METABOLIC PANEL: CPT | Mod: ORL | Performed by: INTERNAL MEDICINE

## 2024-04-11 PROCEDURE — G0103 PSA SCREENING: HCPCS | Mod: ORL | Performed by: INTERNAL MEDICINE

## 2024-04-12 LAB
ALBUMIN SERPL BCG-MCNC: 4 G/DL (ref 3.5–5.2)
ALP SERPL-CCNC: 96 U/L (ref 40–150)
ALT SERPL W P-5'-P-CCNC: 15 U/L (ref 0–70)
ANION GAP SERPL CALCULATED.3IONS-SCNC: 10 MMOL/L (ref 7–15)
AST SERPL W P-5'-P-CCNC: 18 U/L (ref 0–45)
BILIRUB SERPL-MCNC: 0.3 MG/DL
BUN SERPL-MCNC: 15.8 MG/DL (ref 8–23)
CALCIUM SERPL-MCNC: 9 MG/DL (ref 8.8–10.2)
CHLORIDE SERPL-SCNC: 103 MMOL/L (ref 98–107)
CHOLEST SERPL-MCNC: 189 MG/DL
CREAT SERPL-MCNC: 1.01 MG/DL (ref 0.67–1.17)
DEPRECATED HCO3 PLAS-SCNC: 25 MMOL/L (ref 22–29)
EGFRCR SERPLBLD CKD-EPI 2021: 81 ML/MIN/1.73M2
FASTING STATUS PATIENT QL REPORTED: ABNORMAL
GLUCOSE SERPL-MCNC: 100 MG/DL (ref 70–99)
HDLC SERPL-MCNC: 58 MG/DL
LDLC SERPL CALC-MCNC: 114 MG/DL
NONHDLC SERPL-MCNC: 131 MG/DL
POTASSIUM SERPL-SCNC: 5 MMOL/L (ref 3.4–5.3)
PROT SERPL-MCNC: 6.5 G/DL (ref 6.4–8.3)
PSA SERPL DL<=0.01 NG/ML-MCNC: 3.04 NG/ML (ref 0–4.5)
SODIUM SERPL-SCNC: 138 MMOL/L (ref 135–145)
TRIGL SERPL-MCNC: 86 MG/DL

## 2025-01-18 ENCOUNTER — HEALTH MAINTENANCE LETTER (OUTPATIENT)
Age: 70
End: 2025-01-18

## 2025-07-14 ENCOUNTER — LAB REQUISITION (OUTPATIENT)
Dept: LAB | Facility: CLINIC | Age: 70
End: 2025-07-14
Payer: COMMERCIAL

## 2025-07-14 DIAGNOSIS — Z01.818 ENCOUNTER FOR OTHER PREPROCEDURAL EXAMINATION: ICD-10-CM

## 2025-07-14 LAB
ALBUMIN UR-MCNC: NEGATIVE MG/DL
APPEARANCE UR: CLEAR
BASOPHILS # BLD AUTO: 0.1 10E3/UL (ref 0–0.2)
BASOPHILS NFR BLD AUTO: 1 %
BILIRUB UR QL STRIP: NEGATIVE
COLOR UR AUTO: YELLOW
EOSINOPHIL # BLD AUTO: 0.3 10E3/UL (ref 0–0.7)
EOSINOPHIL NFR BLD AUTO: 4 %
ERYTHROCYTE [DISTWIDTH] IN BLOOD BY AUTOMATED COUNT: 13.6 % (ref 10–15)
GLUCOSE UR STRIP-MCNC: NEGATIVE MG/DL
HCT VFR BLD AUTO: 47.2 % (ref 40–53)
HGB BLD-MCNC: 15.3 G/DL (ref 13.3–17.7)
HGB UR QL STRIP: NEGATIVE
IMM GRANULOCYTES # BLD: 0 10E3/UL
IMM GRANULOCYTES NFR BLD: 0 %
KETONES UR STRIP-MCNC: NEGATIVE MG/DL
LEUKOCYTE ESTERASE UR QL STRIP: NEGATIVE
LYMPHOCYTES # BLD AUTO: 1.7 10E3/UL (ref 0.8–5.3)
LYMPHOCYTES NFR BLD AUTO: 23 %
MCH RBC QN AUTO: 30.2 PG (ref 26.5–33)
MCHC RBC AUTO-ENTMCNC: 32.4 G/DL (ref 31.5–36.5)
MCV RBC AUTO: 93 FL (ref 78–100)
MONOCYTES # BLD AUTO: 0.6 10E3/UL (ref 0–1.3)
MONOCYTES NFR BLD AUTO: 8 %
MUCOUS THREADS #/AREA URNS LPF: PRESENT /LPF
NEUTROPHILS # BLD AUTO: 4.7 10E3/UL (ref 1.6–8.3)
NEUTROPHILS NFR BLD AUTO: 63 %
NITRATE UR QL: NEGATIVE
NRBC # BLD AUTO: 0 10E3/UL
NRBC BLD AUTO-RTO: 0 /100
PH UR STRIP: 5.5 [PH] (ref 5–7)
PLATELET # BLD AUTO: 306 10E3/UL (ref 150–450)
RBC # BLD AUTO: 5.07 10E6/UL (ref 4.4–5.9)
RBC URINE: 1 /HPF
SP GR UR STRIP: 1.03 (ref 1–1.03)
SQUAMOUS EPITHELIAL: <1 /HPF
UROBILINOGEN UR STRIP-MCNC: NORMAL MG/DL
WBC # BLD AUTO: 7.5 10E3/UL (ref 4–11)
WBC URINE: 1 /HPF

## 2025-07-14 PROCEDURE — 85025 COMPLETE CBC W/AUTO DIFF WBC: CPT | Mod: ORL | Performed by: INTERNAL MEDICINE

## 2025-07-14 PROCEDURE — 80053 COMPREHEN METABOLIC PANEL: CPT | Mod: ORL | Performed by: INTERNAL MEDICINE

## 2025-07-14 PROCEDURE — 81001 URINALYSIS AUTO W/SCOPE: CPT | Mod: ORL | Performed by: INTERNAL MEDICINE

## 2025-07-15 LAB
ALBUMIN SERPL BCG-MCNC: 4.1 G/DL (ref 3.5–5.2)
ALP SERPL-CCNC: 116 U/L (ref 40–150)
ALT SERPL W P-5'-P-CCNC: 14 U/L (ref 0–70)
ANION GAP SERPL CALCULATED.3IONS-SCNC: 11 MMOL/L (ref 7–15)
AST SERPL W P-5'-P-CCNC: 17 U/L (ref 0–45)
BILIRUB SERPL-MCNC: 0.3 MG/DL
BUN SERPL-MCNC: 17.6 MG/DL (ref 8–23)
CALCIUM SERPL-MCNC: 9.2 MG/DL (ref 8.8–10.4)
CHLORIDE SERPL-SCNC: 106 MMOL/L (ref 98–107)
CREAT SERPL-MCNC: 1.21 MG/DL (ref 0.67–1.17)
EGFRCR SERPLBLD CKD-EPI 2021: 64 ML/MIN/1.73M2
GLUCOSE SERPL-MCNC: 102 MG/DL (ref 70–99)
HCO3 SERPL-SCNC: 24 MMOL/L (ref 22–29)
POTASSIUM SERPL-SCNC: 4.1 MMOL/L (ref 3.4–5.3)
PROT SERPL-MCNC: 6.5 G/DL (ref 6.4–8.3)
SODIUM SERPL-SCNC: 141 MMOL/L (ref 135–145)